# Patient Record
Sex: FEMALE | Race: WHITE
[De-identification: names, ages, dates, MRNs, and addresses within clinical notes are randomized per-mention and may not be internally consistent; named-entity substitution may affect disease eponyms.]

---

## 2017-04-07 ENCOUNTER — HOSPITAL ENCOUNTER (OUTPATIENT)
Dept: HOSPITAL 83 - CT | Age: 67
Discharge: HOME | End: 2017-04-07
Attending: INTERNAL MEDICINE
Payer: COMMERCIAL

## 2017-04-07 DIAGNOSIS — Z90.49: ICD-10-CM

## 2017-04-07 DIAGNOSIS — R63.0: ICD-10-CM

## 2017-04-07 DIAGNOSIS — Z87.891: ICD-10-CM

## 2017-04-07 DIAGNOSIS — Z90.710: ICD-10-CM

## 2017-04-07 DIAGNOSIS — R93.5: Primary | ICD-10-CM

## 2017-04-07 DIAGNOSIS — K59.00: ICD-10-CM

## 2017-04-07 DIAGNOSIS — M43.26: ICD-10-CM

## 2017-04-26 ENCOUNTER — HOSPITAL ENCOUNTER (OUTPATIENT)
Dept: HOSPITAL 83 - RAD | Age: 67
Discharge: HOME | End: 2017-04-26
Attending: INTERNAL MEDICINE
Payer: COMMERCIAL

## 2017-04-26 DIAGNOSIS — Z78.0: Primary | ICD-10-CM

## 2017-12-14 ENCOUNTER — HOSPITAL ENCOUNTER (EMERGENCY)
Dept: HOSPITAL 83 - ED | Age: 67
Discharge: HOME | End: 2017-12-14
Payer: COMMERCIAL

## 2017-12-14 VITALS — BODY MASS INDEX: 19.56 KG/M2 | WEIGHT: 97 LBS | HEIGHT: 58.98 IN

## 2017-12-14 DIAGNOSIS — F17.200: ICD-10-CM

## 2017-12-14 DIAGNOSIS — N39.0: Primary | ICD-10-CM

## 2017-12-14 DIAGNOSIS — R03.0: ICD-10-CM

## 2017-12-14 DIAGNOSIS — Z88.0: ICD-10-CM

## 2017-12-14 DIAGNOSIS — K59.00: ICD-10-CM

## 2017-12-14 LAB
ALBUMIN SERPL-MCNC: 4.1 GM/DL (ref 3.1–4.5)
ALP SERPL-CCNC: 112 U/L (ref 45–117)
ALT SERPL W P-5'-P-CCNC: 27 U/L (ref 12–78)
APPEARANCE UR: CLEAR
AST SERPL-CCNC: 25 IU/L (ref 3–35)
BACTERIA #/AREA URNS HPF: (no result) /[HPF]
BASOPHILS # BLD AUTO: 0.1 10*3/UL (ref 0–0.1)
BASOPHILS NFR BLD AUTO: 0.8 % (ref 0–1)
BILIRUB UR QL STRIP: NEGATIVE
BUN SERPL-MCNC: 20 MG/DL (ref 7–24)
CHLORIDE SERPL-SCNC: 103 MMOL/L (ref 98–107)
COLOR UR: YELLOW
CREAT SERPL-MCNC: 0.82 MG/DL (ref 0.55–1.02)
EOSINOPHIL # BLD AUTO: 0.1 10*3/UL (ref 0–0.4)
EOSINOPHIL # BLD AUTO: 1.1 % (ref 1–4)
EPI CELLS #/AREA URNS HPF: (no result) /[HPF]
ERYTHROCYTE [DISTWIDTH] IN BLOOD BY AUTOMATED COUNT: 13.2 % (ref 0–14.5)
GLUCOSE UR QL: NEGATIVE
HCT VFR BLD AUTO: 45.2 % (ref 37–47)
HGB BLD-MCNC: 15.3 G/DL (ref 12–16)
HGB UR QL STRIP: NEGATIVE
KETONES UR QL STRIP: NEGATIVE
LEUKOCYTE ESTERASE UR QL STRIP: (no result)
LIPASE SERPL-CCNC: 111 U/L (ref 73–393)
LYMPHOCYTES # BLD AUTO: 3.2 10*3/UL (ref 1.3–4.4)
LYMPHOCYTES NFR BLD AUTO: 23.7 % (ref 27–41)
MCH RBC QN AUTO: 31.8 PG (ref 27–31)
MCHC RBC AUTO-ENTMCNC: 33.8 G/DL (ref 33–37)
MCV RBC AUTO: 94 FL (ref 81–99)
MONOCYTES # BLD AUTO: 0.7 10*3/UL (ref 0.1–1)
MONOCYTES NFR BLD MANUAL: 5.5 % (ref 3–9)
NEUT #: 9.1 10*3/UL (ref 2.3–7.9)
NEUT %: 68.4 % (ref 47–73)
NITRITE UR QL STRIP: NEGATIVE
NRBC BLD QL AUTO: 0 % (ref 0–0)
PH UR STRIP: 5 [PH] (ref 5–9)
PLATELET # BLD AUTO: 373 10*3/UL (ref 130–400)
PMV BLD AUTO: 11.4 FL (ref 9.6–12.3)
POTASSIUM SERPL-SCNC: 3.9 MMOL/L (ref 3.5–5.1)
PROT SERPL-MCNC: 8.2 GM/DL (ref 6.4–8.2)
RBC # BLD AUTO: 4.81 10*6/UL (ref 4.1–5.1)
RBC #/AREA URNS HPF: (no result) RBC/HPF (ref 0–2)
SODIUM SERPL-SCNC: 139 MMOL/L (ref 136–145)
SP GR UR: <= 1.005 (ref 1–1.03)
UROBILINOGEN UR STRIP-MCNC: 0.2 E.U./DL (ref 0.2–1)
WBC NRBC COR # BLD AUTO: 13.3 10*3/UL (ref 4.8–10.8)

## 2017-12-27 ENCOUNTER — HOSPITAL ENCOUNTER (EMERGENCY)
Dept: HOSPITAL 83 - ED | Age: 67
Discharge: HOME | End: 2017-12-27
Payer: COMMERCIAL

## 2017-12-27 VITALS — WEIGHT: 94 LBS | HEIGHT: 58.98 IN | BODY MASS INDEX: 18.95 KG/M2

## 2017-12-27 DIAGNOSIS — F17.200: ICD-10-CM

## 2017-12-27 DIAGNOSIS — Z88.0: ICD-10-CM

## 2017-12-27 DIAGNOSIS — Z86.73: ICD-10-CM

## 2017-12-27 DIAGNOSIS — Z79.82: ICD-10-CM

## 2017-12-27 DIAGNOSIS — F32.9: Primary | ICD-10-CM

## 2017-12-27 DIAGNOSIS — Z79.899: ICD-10-CM

## 2018-04-04 ENCOUNTER — HOSPITAL ENCOUNTER (EMERGENCY)
Dept: HOSPITAL 83 - ED | Age: 68
Discharge: HOME | End: 2018-04-04
Payer: COMMERCIAL

## 2018-04-04 VITALS — BODY MASS INDEX: 25.76 KG/M2 | WEIGHT: 140 LBS | HEIGHT: 61.97 IN

## 2018-04-04 DIAGNOSIS — Z98.890: ICD-10-CM

## 2018-04-04 DIAGNOSIS — Z87.891: ICD-10-CM

## 2018-04-04 DIAGNOSIS — Z86.73: ICD-10-CM

## 2018-04-04 DIAGNOSIS — R25.2: Primary | ICD-10-CM

## 2018-04-04 DIAGNOSIS — Z79.82: ICD-10-CM

## 2018-04-04 DIAGNOSIS — Z88.0: ICD-10-CM

## 2018-04-04 DIAGNOSIS — Z79.899: ICD-10-CM

## 2018-04-04 DIAGNOSIS — Z90.710: ICD-10-CM

## 2018-04-04 LAB
BASOPHILS # BLD AUTO: 0.1 10*3/UL (ref 0–0.1)
BASOPHILS NFR BLD AUTO: 0.6 % (ref 0–1)
BUN SERPL-MCNC: 15 MG/DL (ref 7–24)
CHLORIDE SERPL-SCNC: 109 MMOL/L (ref 98–107)
CREAT SERPL-MCNC: 0.66 MG/DL (ref 0.55–1.02)
EOSINOPHIL # BLD AUTO: 0.3 10*3/UL (ref 0–0.4)
EOSINOPHIL # BLD AUTO: 2.8 % (ref 1–4)
ERYTHROCYTE [DISTWIDTH] IN BLOOD BY AUTOMATED COUNT: 13.3 % (ref 0–14.5)
HCT VFR BLD AUTO: 41.8 % (ref 37–47)
HGB BLD-MCNC: 13.8 G/DL (ref 12–16)
LYMPHOCYTES # BLD AUTO: 3.2 10*3/UL (ref 1.3–4.4)
LYMPHOCYTES NFR BLD AUTO: 29.9 % (ref 27–41)
MCH RBC QN AUTO: 31.7 PG (ref 27–31)
MCHC RBC AUTO-ENTMCNC: 33 G/DL (ref 33–37)
MCV RBC AUTO: 95.9 FL (ref 81–99)
MONOCYTES # BLD AUTO: 0.8 10*3/UL (ref 0.1–1)
MONOCYTES NFR BLD MANUAL: 7.1 % (ref 3–9)
NEUT #: 6.3 10*3/UL (ref 2.3–7.9)
NEUT %: 59.1 % (ref 47–73)
NRBC BLD QL AUTO: 0 % (ref 0–0)
PLATELET # BLD AUTO: 315 10*3/UL (ref 130–400)
PMV BLD AUTO: 10.8 FL (ref 9.6–12.3)
POTASSIUM SERPL-SCNC: 4.1 MMOL/L (ref 3.5–5.1)
RBC # BLD AUTO: 4.36 10*6/UL (ref 4.1–5.1)
SODIUM SERPL-SCNC: 143 MMOL/L (ref 136–145)
TROPONIN I SERPL-MCNC: < 0.015 NG/ML (ref ?–0.04)
WBC NRBC COR # BLD AUTO: 10.6 10*3/UL (ref 4.8–10.8)

## 2018-06-14 ENCOUNTER — HOSPITAL ENCOUNTER (OUTPATIENT)
Dept: HOSPITAL 83 - CT | Age: 68
Discharge: HOME | End: 2018-06-14
Attending: INTERNAL MEDICINE
Payer: COMMERCIAL

## 2018-06-14 DIAGNOSIS — I70.201: Primary | ICD-10-CM

## 2018-07-18 ENCOUNTER — HOSPITAL ENCOUNTER (OUTPATIENT)
Dept: HOSPITAL 83 - RAD | Age: 68
Discharge: HOME | End: 2018-07-18
Attending: INTERNAL MEDICINE
Payer: COMMERCIAL

## 2018-07-18 DIAGNOSIS — R07.9: Primary | ICD-10-CM

## 2019-01-04 ENCOUNTER — HOSPITAL ENCOUNTER (OUTPATIENT)
Dept: HOSPITAL 83 - US | Age: 69
Discharge: HOME | End: 2019-01-04
Attending: SURGERY
Payer: COMMERCIAL

## 2019-01-04 DIAGNOSIS — Z95.820: ICD-10-CM

## 2019-01-04 DIAGNOSIS — I73.89: Primary | ICD-10-CM

## 2019-01-31 ENCOUNTER — HOSPITAL ENCOUNTER (OUTPATIENT)
Dept: HOSPITAL 83 - CT | Age: 69
Discharge: HOME | End: 2019-01-31
Attending: INTERNAL MEDICINE
Payer: COMMERCIAL

## 2019-01-31 DIAGNOSIS — M51.36: Primary | ICD-10-CM

## 2019-01-31 DIAGNOSIS — M47.896: ICD-10-CM

## 2019-04-30 ENCOUNTER — HOSPITAL ENCOUNTER (OUTPATIENT)
Dept: HOSPITAL 83 - RAD | Age: 69
Discharge: HOME | End: 2019-04-30
Attending: INTERNAL MEDICINE
Payer: COMMERCIAL

## 2019-04-30 DIAGNOSIS — N95.9: ICD-10-CM

## 2019-04-30 DIAGNOSIS — Z90.710: ICD-10-CM

## 2019-04-30 DIAGNOSIS — Z13.820: Primary | ICD-10-CM

## 2019-06-17 ENCOUNTER — HOSPITAL ENCOUNTER (OUTPATIENT)
Dept: HOSPITAL 83 - INJECTION | Age: 69
Discharge: HOME | End: 2019-06-17
Attending: INTERNAL MEDICINE
Payer: COMMERCIAL

## 2019-06-17 VITALS — DIASTOLIC BLOOD PRESSURE: 68 MMHG

## 2019-06-17 DIAGNOSIS — J43.9: ICD-10-CM

## 2019-06-17 DIAGNOSIS — Z86.73: ICD-10-CM

## 2019-06-17 DIAGNOSIS — E78.00: ICD-10-CM

## 2019-06-17 DIAGNOSIS — M81.0: Primary | ICD-10-CM

## 2019-06-17 DIAGNOSIS — F17.200: ICD-10-CM

## 2019-07-06 ENCOUNTER — HOSPITAL ENCOUNTER (EMERGENCY)
Dept: HOSPITAL 83 - ED | Age: 69
Discharge: HOME | End: 2019-07-06
Payer: COMMERCIAL

## 2019-07-06 VITALS — BODY MASS INDEX: 21.57 KG/M2 | HEIGHT: 58.98 IN | WEIGHT: 107 LBS

## 2019-07-06 DIAGNOSIS — H57.89: ICD-10-CM

## 2019-07-06 DIAGNOSIS — Z79.899: ICD-10-CM

## 2019-07-06 DIAGNOSIS — F17.200: ICD-10-CM

## 2019-07-06 DIAGNOSIS — Z88.0: ICD-10-CM

## 2019-07-06 DIAGNOSIS — J44.1: ICD-10-CM

## 2019-07-06 DIAGNOSIS — Z79.82: ICD-10-CM

## 2019-07-06 DIAGNOSIS — J32.9: Primary | ICD-10-CM

## 2019-07-11 ENCOUNTER — HOSPITAL ENCOUNTER (INPATIENT)
Dept: HOSPITAL 83 - ED | Age: 69
LOS: 6 days | Discharge: HOME | DRG: 193 | End: 2019-07-17
Attending: INTERNAL MEDICINE | Admitting: INTERNAL MEDICINE
Payer: COMMERCIAL

## 2019-07-11 VITALS — DIASTOLIC BLOOD PRESSURE: 60 MMHG

## 2019-07-11 VITALS — BODY MASS INDEX: 21.46 KG/M2 | HEIGHT: 60 IN | WEIGHT: 109.31 LBS

## 2019-07-11 VITALS — DIASTOLIC BLOOD PRESSURE: 68 MMHG | SYSTOLIC BLOOD PRESSURE: 144 MMHG

## 2019-07-11 VITALS — DIASTOLIC BLOOD PRESSURE: 76 MMHG

## 2019-07-11 VITALS — DIASTOLIC BLOOD PRESSURE: 72 MMHG

## 2019-07-11 VITALS — DIASTOLIC BLOOD PRESSURE: 90 MMHG

## 2019-07-11 VITALS — DIASTOLIC BLOOD PRESSURE: 66 MMHG | SYSTOLIC BLOOD PRESSURE: 126 MMHG

## 2019-07-11 VITALS — DIASTOLIC BLOOD PRESSURE: 76 MMHG | SYSTOLIC BLOOD PRESSURE: 156 MMHG

## 2019-07-11 DIAGNOSIS — Z88.0: ICD-10-CM

## 2019-07-11 DIAGNOSIS — Z83.3: ICD-10-CM

## 2019-07-11 DIAGNOSIS — E87.2: ICD-10-CM

## 2019-07-11 DIAGNOSIS — Z79.899: ICD-10-CM

## 2019-07-11 DIAGNOSIS — Z98.891: ICD-10-CM

## 2019-07-11 DIAGNOSIS — E89.0: ICD-10-CM

## 2019-07-11 DIAGNOSIS — Z95.820: ICD-10-CM

## 2019-07-11 DIAGNOSIS — J18.1: Primary | ICD-10-CM

## 2019-07-11 DIAGNOSIS — Z82.49: ICD-10-CM

## 2019-07-11 DIAGNOSIS — I73.9: ICD-10-CM

## 2019-07-11 DIAGNOSIS — E78.00: ICD-10-CM

## 2019-07-11 DIAGNOSIS — J96.01: ICD-10-CM

## 2019-07-11 DIAGNOSIS — Z86.73: ICD-10-CM

## 2019-07-11 DIAGNOSIS — M19.90: ICD-10-CM

## 2019-07-11 DIAGNOSIS — J44.1: ICD-10-CM

## 2019-07-11 DIAGNOSIS — Z90.49: ICD-10-CM

## 2019-07-11 DIAGNOSIS — Z90.710: ICD-10-CM

## 2019-07-11 DIAGNOSIS — J44.0: ICD-10-CM

## 2019-07-11 DIAGNOSIS — Z80.8: ICD-10-CM

## 2019-07-11 DIAGNOSIS — M06.9: ICD-10-CM

## 2019-07-11 DIAGNOSIS — G89.29: ICD-10-CM

## 2019-07-11 DIAGNOSIS — Z79.82: ICD-10-CM

## 2019-07-11 DIAGNOSIS — M54.5: ICD-10-CM

## 2019-07-11 DIAGNOSIS — E78.2: ICD-10-CM

## 2019-07-11 DIAGNOSIS — F17.210: ICD-10-CM

## 2019-07-11 LAB
ALBUMIN SERPL-MCNC: 3.8 GM/DL (ref 3.1–4.5)
ALP SERPL-CCNC: 112 U/L (ref 45–117)
ALT SERPL W P-5'-P-CCNC: 20 U/L (ref 12–78)
APTT PPP: 23.7 SECONDS (ref 20–32.1)
AST SERPL-CCNC: 18 IU/L (ref 3–35)
BUN SERPL-MCNC: 15 MG/DL (ref 7–24)
CHLORIDE SERPL-SCNC: 107 MMOL/L (ref 98–107)
CREAT SERPL-MCNC: 0.78 MG/DL (ref 0.55–1.02)
ERYTHROCYTE [DISTWIDTH] IN BLOOD BY AUTOMATED COUNT: 14.6 % (ref 0–14.5)
HCT VFR BLD AUTO: 38.8 % (ref 37–47)
HGB BLD-MCNC: 12.2 G/DL (ref 12–16)
INR BLD: 0.9 (ref 2–3.5)
MCH RBC QN AUTO: 29 PG (ref 27–31)
MCHC RBC AUTO-ENTMCNC: 31.4 G/DL (ref 33–37)
MCV RBC AUTO: 92.2 FL (ref 81–99)
NRBC BLD QL AUTO: 0 10*3/UL (ref 0–0)
PLATELET # BLD AUTO: 563 10*3/UL (ref 130–400)
PLATELET SUFFICIENCY: (no result)
PMV BLD AUTO: 10.4 FL (ref 9.6–12.3)
POTASSIUM SERPL-SCNC: 3.7 MMOL/L (ref 3.5–5.1)
PROT SERPL-MCNC: 7.7 GM/DL (ref 6.4–8.2)
RBC # BLD AUTO: 4.21 10*6/UL (ref 4.1–5.1)
RBC MORPH BLD: NORMAL
SODIUM SERPL-SCNC: 140 MMOL/L (ref 136–145)
TOTAL CELLS COUNTED: 100 #CELLS
TROPONIN I SERPL-MCNC: < 0.015 NG/ML (ref ?–0.04)
WBC NRBC COR # BLD AUTO: 16.8 10*3/UL (ref 4.8–10.8)

## 2019-07-11 NOTE — NUR
PRN Norco given at this time for patient c/o back pain rating a 7/10. Call
light is within reach, will continue to monitor.

## 2019-07-11 NOTE — PR
[place], [place]
 
                                      PROGRESS NOTE
 
        NAME: ERIC SINGH               ACCT #: [id number]  
        UNIT #: [id number]                       ROOM: 516       
        DOCTOR: AMBER NUNN MD               BIRTHDATE: [birth year]
 
 
DOS: 07/17/2019
 
SUBJECTIVE:  The patient is doing well without any complaints this morning,
underwent the bronchoscopy yesterday.
 
OBJECTIVE:
VITAL SIGNS:  Graphic trend shows a pressure 134/66, pulse of 80, respirations
16, temperature 97.9.
LUNGS:  Clear.
HEART:  Regular.
ABDOMEN:  Soft.
EXTREMITIES:  Without any edema.
 
LABORATORY DATA:  Blood culture shows no bacterial growth.  Sputum culture
preliminary moderate, a few gram-negative bacilli, fungal cultures no
abnormality.
 
ASSESSMENT AND PLAN:
1.  Acute exacerbation of chronic obstructive pulmonary disease, improved
bronchospasm, is finally resolved.
2.  Possibility of a lung nodule.  Dr. Srivastava advised PET scan as an outpatient.
3.  Rheumatoid arthritis with also primary osteoarthritis with chronic pain
control.  ESR is normal.  Rest of the rheumatoid panel is not available yet.
4.  Moderate cigarette smoker.  The patient is encouraged to quit smoking.  The
patient declined any medicines for that.
5.  Lactic acidosis of unknown etiology.  The patient did receive fluids for
several days.  We will check the lactic acid before discharge today.
 
 
 
_________________________________
AMBER NUNN MD
 
CM:PNTRANS
 
D: 07/17/19 0655                 
T: 07/17/19 0703
             
                                                            
AMBER NUNN MD               
                                                            
07/17/19
0705
                              
interface

## 2019-07-11 NOTE — NUR
PRN Norco seems effective, patient is sleeping with easy and regular respers
on room air. Call light is within reach.

## 2019-07-11 NOTE — PR
[place], [place]
 
                                      PROGRESS NOTE
 
        NAME: ERIC SINGH               ACCT #: [id number]  
        UNIT #: [id number]                       ROOM: 516       
        DOCTOR: DIANA BOX MD,MOHAN           BIRTHDATE: [birth year]
 
 
DOS: 07/17/2019
 
SUBJECTIVE:  She has been noted marked improvement and reduction in the
respiratory symptoms after bronchoscopy.  Denies symptoms of fever or chills,
coughing or any sputum expectoration or any abdominal pain.
 
OBJECTIVE:
VITAL SIGNS:  Normal temperature, respiratory rate 16, heart rate 100, blood
pressure 130/66 early recorded.  Pulse ox saturation with room air 94%
saturation.
HEENT:  Examination shows head was atraumatic.  Eyes nonicterus.
NECK:  Supple.
CARDIOVASCULAR:  S1, S2 audible.
LUNGS:  The patient was noted clear wheezing bilaterally.  There were no
crackles.
ABDOMEN:  Soft, nontender.
 
LABORATORY DATA:  The culture results of the bronchial washing as normal jeffrey.
 
IMPRESSION:
1. The patient with possibility of small airway disease with a nodular density
in the right lower lobe.
2. Small left upper lung pulmonary nodule as well medially located.
3. Acute exacerbation of chronic obstructive pulmonary disease.
 
PLAN OF MANAGEMENT:  The patient could be discharged home.  The patient's oral
medications as antibiotics and tapering dose of prednisone.  Outpatient followup
for the patient was recommended for the pulmonary nodule.  Other therapy, plan
of management, care plan and treatment.
 
 
 
 
_________________________________
MOHAN ORTIZ MD
 
CM:PNTRANS
 
D: 07/17/19 0959                 
T: 07/17/19 1420
             
                                                            
MOHAN BOX MD           
                                                            
07/24/19
0837
                              
interface

## 2019-07-11 NOTE — PROC NOTE
[place], [place]
 
                                     PROCEDURE NOTE
 
        NAME: ERIC SINGH                ACCT #: [id number]
        UNIT #: [id number]                        ROOM: 516       
        DOCTOR: DIANA BOX MD,MOHAN            BIRTHDATE: [birth year]
 
 
DOS: 07/16/2019
 
BRONCHOSCOPY NOTE
 
PREOPERATIVE DIAGNOSIS:  Nonresolving severe cough with pulmonary nodule.
 
POSTOPERATIVE DIAGNOSIS:  Removal of significant amount of mucus impaction from
major airways bilaterally.
 
FINDINGS:  Tracheobronchitis.
 
PROCEDURE DESCRIPTION:  Informed consent was obtained for the patient.  The
patient was brought to the OR and placed in supine position.  Conscious sedation
was administered by the Anesthesia Department.  After that, the airway
introduced into the mouth.  Bronchoscope was advanced to the airway into
laryngeal area.  Epiglottis and vocal cords seen.  Vocal cord was moving
symmetrically with movements.  Bronchoscope was advanced to the vocal cord and
tracheal lumen.  Tracheal lumen was identified.  Tracheal lumen was noted with
moderate amount of thick mucus, suctioned out to the clemencia level.  Right upper,
right middle, right lower, left upper, lingular lower lobe bronchi noted with
large plugs of mucus, which was removed with the help of normal saline wash
without any difficulty.  Postoperative findings were discussed with the
patient's  and the patient in detail in the recovery room.
 
 
 
_________________________________
MOHAN ORTIZ MD
 
CM:PROCNOTE:PROCEDURE NOTE
 
D: 07/16/19 1246
T: 07/17/19 0043
    
                                                            
MOHAN BOX MD

## 2019-07-11 NOTE — EKG
[place], [place]
 
                               ELECTROCARDIOGRAM REPORT
 
        NAME: ERIC SINGH                ACCT #: [id number]  
        UNIT #: [id number]                        ROOM: 516       
        DOCTOR: MEIR DRAFT REPORT          BIRTHDATE: [birth year]
 
 
 

 
 
                           [place]
                                       
Test Date:    2019-07-15               Test Time:    10:40:45
Pat Name:     ERIC SINGH          Department:   
Patient ID:   [id number]             Room:         51 1
Gender:       F                        Technician:   
:          1950               Requested By: MOHAN BOX
Order Number: [id number]      Reading MD:   Mohan Srivastava MD
                                 Measurements
Intervals                              Axis          
Rate:         97                       P:            77
NV:           122                      QRS:          70
QRSD:         71                       T:            65
QT:           339                                    
QTc:          431                                    
                           Interpretive Statements
Sinus rhythm
Right atrial enlargement
Compared to ECG 2019 17:30:52
No significant changes
 
Electronically Signed On 7- 10:52:02 PDT by Mohan Srivastava MD
 
CM:EKGRPT:ELECTROCARDIOGRAM REPORT
 
D: 07/15/19 1040
T: 07/15/19 1052
    
MOHAN WORLEY DRAFT REPORT         
MOHAN BOX MD

## 2019-07-11 NOTE — PR
[place], [place]
 
                                      PROGRESS NOTE
 
        NAME: ERIC SINGH               ACCT #: [id number]  
        UNIT #: [id number]                       ROOM: 516       
        DOCTOR: AMBER NUNN MD               BIRTHDATE: [birth year]
 
 
DOS: 07/15/2019
 
SUBJECTIVE:  The patient is about the same, does not have any new complaints,
awaiting a bronchoscopy this morning.
 
OBJECTIVE:
VITAL SIGNS:  Blood pressure is 137/79, pulse of 90, respirations 18,
temperature 98.1.
LUNGS:  Diminished breath sounds, scattered wheezes and rhonchi.
HEART:  Regular.
ABDOMEN:  Obese, soft, nontender.
EXTREMITIES:  Without any edema.
 
ASSESSMENT AND PLAN:
1.  Acute exacerbation of chronic obstructive pulmonary disease, improving
slowly.
2.  Right lower lobe pneumonia versus pulmonary nodule, awaiting a bronchoscopy
today.
3.  Rheumatoid arthritis with chronic pain.  ESR is normal.
4.  Moderate cigarette smoker.  The patient is encouraged to quit smoking.  She
has not had any cigarettes since admission.
5.  Chronic pain from primary osteoarthritis along with rheumatoid arthritis. 
Continue pain medications.
6.  Hypoxic respiratory failure, which has resolved.  Await for bronchoscopy
results to decide on discharge planning.
 
 
 
_________________________________
AMBER NUNN MD
 
CM:PNTRANS
 
D: 07/16/19 0808                 
T: 07/16/19 2230
             
                                                            
AMBER NUNN MD               
                                                            
07/16/19
2231
                              
interface

## 2019-07-11 NOTE — NUR
Patient is resting in bed with easy and regular respers on room air.
Assessment is complete with no c/o or s/s of distress noted at this time. Bed
is low, locked, and call light is within reach. Will continue to monitor, see
shift assessment.

## 2019-07-11 NOTE — EKG
[place], [place]
 
                               ELECTROCARDIOGRAM REPORT
 
        NAME: ERIC SINGH                ACCT #: [id number]  
        UNIT #: [id number]                        ROOM: 516       
        DOCTOR: MEIR DRAFT REPORT          BIRTHDATE: [birth year]
 
 
 

 
 
                           [facility]
                                       
Test Date:    2019               Test Time:    14:35:46
Pat Name:     ERIC SINGH          Department:   
Patient ID:   [id number]             Room:         516
Gender:       F                        Technician:   Jil Connelly
:          1950               Requested By: BRYAN VERDE
Order Number: [id number]      Reading MD:   Fely Paz MD
                                 Measurements
Intervals                              Axis          
Rate:         84                       P:            75
IL:           130                      QRS:          51
QRSD:         72                       T:            40
QT:           386                                    
QTc:          457                                    
                           Interpretive Statements
Sinus rhythm
Right atrial enlargement
Compared to ECG 2018 20:57:04
No significant changes
 
Electronically Signed On 2019 5:00:11 PDT by Fely Paz MD
 
CM:EKGRPT:ELECTROCARDIOGRAM REPORT
 
D: 19 1435
T: 19 0500
    
BRYAN WORLEY DRAFT REPORT         
BRYAN VERDE M.D.

## 2019-07-11 NOTE — PR
[place], [place]
 
                                      PROGRESS NOTE
 
        NAME: ERIC SINGH               ACCT #: [id number]  
        UNIT #: [id number]                       ROOM: 516       
        DOCTOR: CARLA PUENTE MD            BIRTHDATE: [birth year]
 
 
DOS: 07/14/2019
 
OBJECTIVE:
GENERAL APPEARANCE:  The patient is alert and oriented x 3, in no visible
distress.
VITAL SIGNS:  Blood pressure 132/76, heart rate 96 beats per minute, breathing
18 times a minute, temperature 98.3 degrees Fahrenheit.
HEENT AND NECK:  Exam within normal limits.
CARDIOVASCULAR SYSTEM:  Heart rate is regular in rate and rhythm.  S1 and S2
normally audible.
LUNGS:  Clear to auscultation.
ABDOMEN:  Soft, nontender.  No obvious organomegaly.  Bowel sounds are present.
EXTREMITIES:  Without significant cyanosis or edema.
 
IMPRESSION:
1.  The patient with acute exacerbation of chronic obstructive pulmonary
disease, continues to improve with treatment.  The patient on IV corticosteroids
and antibiotic, bronchodilators.  CT scan of the chest shows infiltrate versus
pulmonary nodule, which is a new finding since last CAT scan performed in 2014
in the right lower lung.
2.  Right lower lung infiltrate.  Dr. Srivastava been consulted to evaluate.
3.  Arterial Dopplers of the lower extremities, status post stenting, is normal.
4.  Nicotine smoke dependence.  The patient is being encouraged to stop.
5.  Hypothyroidism, treated with levothyroxine.
6.  Mixed hyperlipidemia, treated with simvastatin.
 
 
 
_________________________________
CARLA PUENTE MD
 
CM:PNTRANS
 
D: 07/14/19 1518                 
T: 07/15/19 0045
             
                                                            
CARLA PUENTE MD            
                                                            
07/15/19
0043
                              
interface

## 2019-07-11 NOTE — NUR
CCA 68, admitted to [facility], under the
services of AMBER Lujan MD with a diagnosis of SEVERE SEPSIS, PNEUMONIA.
Chief complaint is SOB.
Patient arrived via  from ER.
Monitor applied. Initial assessment completed.
Vital signs taken and recorded.
AMBER LUJAN MD notified of admission to the unit.
Orders received.
See assessment for past medical history, medications
and allergies.
Patient and/or family oriented to unit. [facility] 516-1
visitation policy reviewed.
Clothing/patient valuable form completed.
 
JAZMÍN COTTER.

## 2019-07-11 NOTE — WRIGHTHP
[place], [place]
 
                               PATIENT HISTORY AND PHYSICAL EXAM
 
        [facility]: ERIC SINGH                [id number]  
        [facility] #: [id number]                        ROOM: 516       
        DOCTOR: AMBER NUNN MD                BIRTHDATE: [birth year]
 
 
DOS: 07/11/2019
 
HISTORY OF PRESENT ILLNESS:  This patient is a 68-year-old.  The patient was
seen in the office with complaints of increasing shortness of breath.  There
were no direct beds of admission, so the patient was sent out to the Emergency
Room.  She was reevaluated again in the ER, this is the second visit in the last
3-4 days.  This time she was diagnosed with pneumonia and was admitted.  In the
office, she was hypoxic with a saturation in the low 90s on room air.  She
denies having any chest pains, palpitations.  The cough is productive of scant
amount of sputum.  Denies having any nausea, any emesis, chest pains or
palpitations.
 
PAST MEDICAL HISTORY:  Significant for:
1.  Hospital admission last year with complaints of chest pain with a negative
stress test.
2.  Moderate nicotine abuse.
3.  Hypothyroidism.
4.  Mixed hyperlipidemia.
 
MEDICATIONS:  She is on are Plavix 75 daily, aspirin 81 daily, Norco 5 q.4,
levothyroxine 100 mcg, simvastatin 40 daily.
 
SOCIAL HISTORY:  Smoker of about 1 pack of cigarettes a day.  Denies using any
alcohol.  She lives at home alone.
 
PHYSICAL EXAMINATION:
GENERAL:  She is awake and alert and oriented.  She was pretty short of breath
when I initially saw in the office with mild-to-moderate respiratory distress,
room air saturation of 90.
VITAL SIGNS:  Pulse 70, respirations 20, temperature 97.9, blood pressure of
134/75.
LUNGS:  Diminished breath sounds, scattered wheezes and rhonchi.
HEART:  Regular.
ABDOMEN:  Obese, soft, nontender.
EXTREMITIES:  Without any edema.
 
ASSESSMENT AND PLAN:
1.  Acute exacerbation of chronic obstructive pulmonary disease.  The patient is
placed on IV steroids and antibiotics.
2.  Moist sounding cough with sepsis pattern, will do blood cultures, sputum
cultures.  Mucinex has been added.  IV antibiotics have been added.  A CT of the
chest will be done to rule out extent of pneumonia.
3.  Moderate cigarette smoker.  The patient does not want to have any patch.
4.  Severe peripheral vascular disease, status post stenting.  She was supposed
to undergo a peripheral arterial Doppler and Radiology today will try to get it
done since she is inpatient.
 
 
 
 
                              [place], [place]
 
                               PATIENT HISTORY AND PHYSICAL EXAM
 
        NAME: ERIC SINGH                ACCT #: [id number]  
        UNIT #: [id number]                        ROOM: [facility]       
        DOCTOR: AMBER NUNN MD                BIRTHDATE: [birth year]
 
 
_________________________________
AMBER NUNN MD
 
CM:HISPHYS:PATIENT HISTORY AND PHYSICAL EXAMINATION
 
D: 07/12/19 0741
T: 07/12/19 0751
    
                                                            
AMBER NUNN MD               
                                                            
07/12/19
0750
                              
interface

## 2019-07-11 NOTE — PR
[place], [place]
 
                                      PROGRESS NOTE
 
        NAME: ERIC SINGH               ACCT #: [id number]  
        UNIT #: [id number]                       ROOM: 516       
        DOCTOR: AMBER NUNN MD               BIRTHDATE: [birth year]
 
 
DOS: 
 
SUBJECTIVE:  The patient complains of a lot of diffuse aches and pains.
 
OBJECTIVE:
VITAL SIGNS:  Graphic trend shows a pressure of 136/62, pulse of 70,
respirations 16, temperature 98.
LUNGS:  Diminished breath sounds.  Fairly clear this morning.
HEART:  Regular.
ABDOMEN:  Soft.
EXTREMITIES:  Without any edema.
 
ASSESSMENT AND PLAN:
1.  Right lower lobe pneumonia with no nodular opacities suggestive of
atelectasis versus pulmonary nodule.  Awaiting Dr. Srivastava's input.  The patient
may benefit from a bronchoscopy.  Clinically, the patient is improving as far as
the pneumonia is concerned.
2.  Acute exacerbation of chronic obstructive pulmonary disease, stable.  We
will taper the steroids down.
3.  Rheumatoid arthritis with chronic pain, already on multiple pain
medications.  We will do rheumatoid panel and see whether she will qualify for
low dose methotrexate.
 
 
 
_________________________________
AMBER NUNN MD
 
CM:PNTRANS
 
D: 07/15/19 0705                 
T: 07/15/19 0713
             
                                                            
AMBER NUNN MD               
                                                            
07/16/19
0432
                              
interface

## 2019-07-11 NOTE — EKG
[place], [place]
 
                               ELECTROCARDIOGRAM REPORT
 
        NAME: ERCI SINGH                ACCT #: [id number]  
        UNIT #: [id number]                        ROOM: 516       
        DOCTOR: MEIR DRAFT REPORT          BIRTHDATE: [birth year]
 
 
 

 
 
                           [facility]
                                       
Test Date:    2019               Test Time:    12:04:06
Pat Name:     ERIC SINGH          Department:   
Patient ID:   [id number]             Room:         516
Gender:       F                        Technician:   
:          1950               Requested By: BRYAN VERDE
Order Number: [id number]      Reading MD:   Fely Paz MD
                                 Measurements
Intervals                              Axis          
Rate:         92                       P:            76
NH:           119                      QRS:          61
QRSD:         76                       T:            51
QT:           375                                    
QTc:          464                                    
                           Interpretive Statements
Sinus arrhythmia
Borderline short NH interval
Compared to ECG 2018 20:57:04
Sinus rhythm no longer present
Atrial abnormality no longer present
 
Electronically Signed On 2019 4:17:38 PDT by Fely Paz MD
 
CM:EKGRPT:ELECTROCARDIOGRAM REPORT
 
D: 19 1204
T: 19 0417
    
BRYAN WORLEY DRAFT REPORT         
BRYAN VERDE M.D.

## 2019-07-11 NOTE — EKG
[place], [place]
 
                               ELECTROCARDIOGRAM REPORT
 
        NAME: ERIC SINGH                ACCT #: [id number]  
        UNIT #: [id number]                        ROOM: 516       
        DOCTOR: MEIR DRAFT REPORT          BIRTHDATE: [birth year]
 
 
 

 
 
                           [facility]
                                       
Test Date:    2019               Test Time:    17:30:52
Pat Name:     ERIC SINGH          Department:   
Patient ID:   [id number]             Room:         516
Gender:       F                        Technician:   Jil Connelly
:          1950               Requested By: BRYAN VERDE
Order Number: [id number]      Reading MD:   Fely Paz MD
                                 Measurements
Intervals                              Axis          
Rate:         85                       P:            83
IL:           129                      QRS:          73
QRSD:         79                       T:            69
QT:           383                                    
QTc:          456                                    
                           Interpretive Statements
Sinus rhythm
Right atrial enlargement
Compared to ECG 2018 20:57:04
No significant changes
 
Electronically Signed On 2019 5:02:35 PDT by Fely Paz MD
 
CM:EKGRPT:ELECTROCARDIOGRAM REPORT
 
D: 19 1730
T: 19 0502
    
BRYAN WORLEY DRAFT REPORT         
BRYAN VERDE M.D.

## 2019-07-11 NOTE — CON
[place], [place]
 
                                 REPORT OF CONSULTATION
 
        NAME: ERIC SINGH                 [facility]: [id number]  
        UNIT #: [id number]                         ROOM: 516       
        DOCTOR: MOHAN REINOSO MD             BIRTHDATE: [birth year]
 
 
DOS: 07/15/2019
 
PULMONARY CONSULTATION, EVALUATION, AND MANAGEMENT
 
REASON FOR CONSULTATION:  To assess the patient's symptoms of shortness of
breath and cough.
 
HISTORY OF PRESENT ILLNESS:  A 68-year-old white female who has been admitted to
the hospital since 2019.  The patient presented to the hospital Emergency
Room as she has been started experiencing increased symptoms of chest congestion
and tightness in the chest.  She was also reporting symptoms of progressive
cough, which remained nonproductive.  She has been coughing excessively, unable
to expectorate sputum.  Complaining of soreness in the upper portion of abdomen
because of excessive nonproductive cough.  The patient denies symptoms of
hemoptysis.  Shortness of breath and wheezing was also reported.  The patient
has been treated with intravenous steroids and other medical management for
acute exacerbation of COPD.  CT scan of the chest was completed that has
reported evidence of pulmonary nodule.
 
REVIEW OF SYSTEMS:
CONSTITUTIONAL:  Fatigue and tiredness noted without any symptoms of fever or
chills.
EYES:  Denies any burning, redness, or tenderness.
EARS, NOSE, THROAT SYMPTOMS:  Denies sore throat, hoarseness, otalgia, postnasal
drainage or epistaxis.
CARDIOVASCULAR:  Denies anginal pain, edema, or pain of the lower extremities.
GASTROINTESTINAL SYMPTOMS:  Denies dysphagia, nausea, vomiting, diarrhea,
abdominal pain, hematemesis, or melena.  The abdominal pain reported in the
anterior abdominal wall is related to the cough and muscle contraction.
GENITOURINARY SYMPTOMS:  Denies dysuria, suprapubic pain, or hematuria.
MUSCULOSKELETAL:  No acute joint pain, redness, or tenderness.
SKIN:  No lesions or rashes reported.
 
Remaining systems were reviewed.  They were noted all negative.
 
PAST MEDICAL HISTORY:  Known reported:
1.  COPD.
2.  Chronic nicotine dependence.
3.  Hypothyroidism.
4.  Hypercholesterolemia and hyperlipidemia.
5.  Anginal pain.
6.  History of transient ischemic attack.
7.  Intervertebral disk disease.
 
PAST SURGICAL HISTORY:  Noted:
1.  .
2.  Hysterectomy.
3.  Cholecystectomy.
4.  Intervertebral disk removed of the lumbar and cervical spine.
5.  Bilateral carpal tunnel release.
 
                              [place], [place]
 
                                 REPORT OF CONSULTATION
 
        NAME: ERIC SINGH                 ACCT #: [id number]  
        UNIT #: [id number]                         ROOM: 516       
        DOCTOR: MOHAN REINOSO MD             BIRTHDATE: [birth year]
 
 
 
SOCIAL HISTORY:  The patient is , has 2 children, lives at home. 
Tobacco use noted since teens, a pack of cigarettes per day.  Active use noted
since hospitalization.
 
FAMILY HISTORY:  Reported for diabetes, hypertension and cancer.
 
HOME MEDICATIONS:  Medications which were listed in the home used as aspirin,
Plavix.  Recent prescription oral doxycycline, Norco, levothyroxine, tapering
prednisone and simvastatin.
 
DRUG ALLERGIES:  NOTED AS ALLERGY TO PENICILLINS.
 
CURRENT MEDICATIONS:  Administered Solu-Medrol 30 mg q. 8 hours, Plavix,
aspirin, levothyroxine, simvastatin, Mucinex 600 mg p.o. b.i.d., DuoNeb q. 4
hours and other meds.
 
PHYSICAL EXAMINATION:
GENERAL:  This is a 68-year-old female who is currently noted to be awake and
alert sitting on the bed this morning of assessment.  The weight of 109 pounds,
BMI 21.3.
VITAL SIGNS:  Normal temperature since hospitalization, respiratory rate
recorded as 16-20, heart rate of 102-91, blood pressure ____.  Pulse oxygen
saturation on rest and room air is 94% saturation recorded.
HEENT:  Examination shows head was atraumatic.  Eyes nonicterus.
NECK:  Supple.
CARDIOVASCULAR:  S1, S2 is audible.  No added sounds.
LUNGS:  Noted diffuse reduction in breath sounds with expiratory wheezing noted,
moderate to severe.
ABDOMEN:  Soft, nontender.  Bowel sounds present.
EXTREMITIES:  Without acute edema.
MUSCULOSKELETAL:  Without acute deformities.
CENTRAL NERVOUS SYSTEM:  Cranial nerves 2-12 intact.
 
LABORATORY DATA:  Reviewed PT/PTT on 2019 normal on admission noted as
normal CMP.  CBC of 2019, WBC count 16.4, hemoglobin and hematocrit
normal, platelet count normal.  Lactic acid 2.1, then variably elevated.  Blood
culture from 2019 shows no bacterial growth.  ESR was 27.  Chest x-ray
that was done one-view reviewed the finding of chronic obstructive pulmonary
disease, hyperinflation without any acute pulmonary infiltration.  CT scan chest
that was completed on 2019 was reported by the radiologist report as 1.4
cm x 0.7 cm.  Patchy airspace density in the medial basal subsegment of the
right lower lobe.  A small nodule, 4.5 mm noted in the left upper lobe medially.
 It was reported as previous size increased from 4.6 mm to 5.8 mm of maximum
dimension.  The CT scan comparison was made from 2019 was compared to the
CT scan of 2014.  Changes of centrilobular emphysema was also noted.
 
IMPRESSION:
1.  Small acute localize infiltration pneumonia was noted in the right lower
lobe is most likely; however, the 2 nodules cannot be completely excluded.
 
                              [place], [place]
 
                                 REPORT OF CONSULTATION
 
        NAME: ERIC SINGH                 ACCT #: [id number]  
        UNIT #: [id number]                         ROOM: 51       
        DOCTOR: MOHAN REINOSO MD             BIRTHDATE: [birth year]
 
 
2.  Small nodule noted in the left upper lobe as well as nodular density in the
right lower lobe.  The right lower lobe could be considered localized
atelectasis and infection.
3.  Ongoing acute exacerbation of chronic obstructive pulmonary disease that has
not been resolving with current medical management.
4.  Chronic nicotine dependence.
 
PLAN OF MANAGEMENT:  Continuation of the bronchodilators, oxygen supplementation
and increase the dose of corticosteroids to 40 mg every 8 hours.  Bronchoscopy
will be done for assessment of the mucus impactions as the patient has not been
showing much improvement in symptoms and continued to remain with ongoing
symptoms of cough, which has not been resolving the musculoskeletal symptoms
because of excessive contraction of the muscles with ineffective sputum
expectoration with cough.  The patient will be continued with continuation of
the Mucinex.  Continue bronchodilators.  Other therapy, plan of management and
additional treatment changes will be recommended based on progression of
illness.  Risk and the benefits of the bronchoscopy were done and agreed.  The
procedure will be done tomorrow morning.
 
 
 
_________________________________
MOHAN ORTIZ MD
 
CM:CONSTR:REPORT OF CONSULTATION
 
D: 07/15/19 1223   T: 07/15/19                           
07/15/19     1537                                          interface

## 2019-07-11 NOTE — PR
[place], [place]
 
                                      PROGRESS NOTE
 
        NAME: ERIC SINGH               ACCT #: [id number]  
        UNIT #: [id number]                       ROOM: 516       
        DOCTOR: CARLA PUENTE MD            BIRTHDATE: [birth year]
 
 
DOS: 07/14/2019
 
ASSESSMENT:
1.  The patient with right lower lobe airspace density.  Dr. Srivastava consulted for
evaluation.
2.  Acute exacerbation of chronic obstructive pulmonary disease, being treated
with corticosteroids and antibiotics, improving.
3.  Severe peripheral arterial disease status post stenting and arterial
Dopplers showing normal flow.
4.  Nicotine smoke dependence.  The patient refused nicotine patch.
5.  Hypothyroidism, replaced with levothyroxine.
6.  Mixed hyperlipidemia, treated with simvastatin.
 
 
 
_________________________________
CARLA PUENTE MD
 
CM:PNTRANS
 
D: 07/14/19 1733                 
T: 07/15/19 0146
             
                                                            
CARLA PUENTE MD            
                                                            
07/15/19
1854
                              
interface

## 2019-07-11 NOTE — DS
[place], [place]
 
                                    DISCHARGE SUMMARY
 
        NAME: ERIC SINGH                [id number]  
        UNIT #: [id number]                        ROOM: 516       
        DOCTOR: AMBER NUNN MD                BIRTHDATE: [birth year]
 
 
DOS: 07/17/2019
 
DIAGNOSES:
1.  Acute exacerbation of chronic obstructive pulmonary disease.
2.  Acute hypoxic respiratory failure, resolved.
3.  Lactic acidosis.
4.  Right lower lobe pneumonia with a nodular density, status post bronchoscopy
with negative cultures.
5.  History of cerebrovascular accident.
6.  Peripheral vascular disease, status post angioplasty.
7.  Rheumatoid arthritis.
8.  Primary osteoarthritis with chronic low back pain.
9.  Hypothyroidism.
10.  Mixed hyperlipidemia.
 
DISCHARGE MEDICATIONS:  Medications on discharge will be doxycycline 100 mg p.o.
b.i.d. for 7 days, Plavix 75 daily, simvastatin 40 daily, aspirin 81 mg daily,
Norco 5/325 q.4 hours p.r.n., omeprazole 40 daily, prednisone tapering dose,
levothyroxine 100 mcg daily, DuoNeb q.4 hours.  Breathing treatments with DuoNeb
q.6 hours.
 
HOSPITAL COURSE:  This patient is 68 years old, very well known to us, comes in
with complaints of difficulty breathing.  She had been to the Emergency Room 2
days prior to this admission, was prescribed antibiotics and steroids, started
the medication without any improvement, decided to come into the office.  By the
time she arrived in the office, she was significantly tachypneic and in moderate
respiratory distress with hypoxemia with an oxygen saturation of 90 on room air.
 She was sent to the ER since there were no beds available.  On visit in the ER,
she was admitted.  After admission, she was placed on IV steroids, antibiotics,
breathing treatments and oxygen supplementation.
 
She did have evident lactic acidosis.  CT of the chest was obtained, which
showed pulmonary small pneumonia, bronchial narrowing.  Dr. Srivastava was consulted. 
Bronchoscopy was performed.  Bronch cultures so far shows no bacterial growth. 
The patient needs to start antibiotics and continue that for 10 days and repeat
a PET scan as an outpatient to see whether there are any abnormalities, because
there is a possibility of a nodule.  The patient is also encouraged to quit
smoking.  The patient states that she did not want any medicines to quit smoking
because she states that she has already quitted and is not interested in smoking
any longer.
 
 
 
 
                              [place], [place]
 
                                    DISCHARGE SUMMARY
 
        NAME: ERIC SINGH                ACCT #: [id number]  
        UNIT #: [id number]                        ROOM: 516       
        DOCTOR: AMBER NUNN MD                BIRTHDATE: [birth year]
 
 
_________________________________
AMBER NUNN MD
 
CM:ZAHRA
 
D: 07/17/19 0700
T: 07/17/19 0711
    
                                                            
AMBER NUNN MD               
                                                            
07/17/19
1655
                              
interface

## 2019-07-12 VITALS — DIASTOLIC BLOOD PRESSURE: 76 MMHG | SYSTOLIC BLOOD PRESSURE: 148 MMHG

## 2019-07-12 VITALS — DIASTOLIC BLOOD PRESSURE: 75 MMHG

## 2019-07-12 VITALS — DIASTOLIC BLOOD PRESSURE: 76 MMHG

## 2019-07-12 VITALS — DIASTOLIC BLOOD PRESSURE: 81 MMHG | SYSTOLIC BLOOD PRESSURE: 136 MMHG

## 2019-07-12 VITALS — SYSTOLIC BLOOD PRESSURE: 126 MMHG | DIASTOLIC BLOOD PRESSURE: 76 MMHG

## 2019-07-12 NOTE — NUR
in to talk to patient.
Patient states lives at HOME with ALONE.
There are BASEMENT steps in the home.
Physician: ARLINE
[facility]acy: [facility]
Home health services: NONE
Patient's level of ADLs: INDEPENDENT
Patient has working utilities: YES
DME: NONE
Follow-up physician's appointment after d/c: WILL BE MADE BY HOSPITALIST NURSE
DIRECTOR ON DISCHARGE
Does patient want to access PORTAL?: NO
Discharge plan PT STATES SHE LIVES AT HOME ALONE AND IS INDEPENDENT IN HER
CARE.  DENIES SHE HAS NEEDS AT HOME.  STATES SHE WILL RETURN HOME ON DISCHARGE
WILL CONTINUE TO FOLLOW.  WILL HAVE A RIDE HOME ON DISCHARGE..
 
LONG,CAMRON

## 2019-07-12 NOTE — NUR
PRN Tylenol given for c/o headache, patient tolerated well. Call light is
within reach, will monitor.

## 2019-07-12 NOTE — NUR
PRN NORCO GIVEN FOR PT COMPLAINTS OF BACK PAIN RATING IT 7/10. CALL LIGHT
WITHIN REACH, WILL MONITOR

## 2019-07-12 NOTE — NUR
Patient arouses easily for administration of 0600 medications. Patient
tolerated well, call light is within reach.

## 2019-07-13 VITALS — DIASTOLIC BLOOD PRESSURE: 68 MMHG | SYSTOLIC BLOOD PRESSURE: 136 MMHG

## 2019-07-13 VITALS — DIASTOLIC BLOOD PRESSURE: 80 MMHG | SYSTOLIC BLOOD PRESSURE: 144 MMHG

## 2019-07-13 VITALS — SYSTOLIC BLOOD PRESSURE: 124 MMHG | DIASTOLIC BLOOD PRESSURE: 58 MMHG

## 2019-07-13 VITALS — DIASTOLIC BLOOD PRESSURE: 71 MMHG

## 2019-07-13 VITALS — DIASTOLIC BLOOD PRESSURE: 68 MMHG | SYSTOLIC BLOOD PRESSURE: 149 MMHG

## 2019-07-13 NOTE — NUR
Patient requested and was medicated as ordered PRN [place] 5/325mg PO for
complaint of pain to back rated per patient as a 7.5 on a 1-10 scale. Will
monitor patient for effectiveness.

## 2019-07-13 NOTE — NUR
Reassessed patient for pain, patient rates pain as a 3 on 1-10
scale at this time.  Pain medication appears effective.  Will continue to
monitor.
Ondina Hough OV[employer] Student
Flor Wilson OV[employer] instructor

## 2019-07-14 VITALS — SYSTOLIC BLOOD PRESSURE: 130 MMHG | DIASTOLIC BLOOD PRESSURE: 65 MMHG

## 2019-07-14 VITALS — DIASTOLIC BLOOD PRESSURE: 68 MMHG

## 2019-07-14 VITALS — DIASTOLIC BLOOD PRESSURE: 60 MMHG

## 2019-07-14 VITALS — DIASTOLIC BLOOD PRESSURE: 76 MMHG

## 2019-07-14 NOTE — NUR
GOOD EFFECT FROM NORCO GIVEN AT 2259, AS EVIDENCED BY PATIENT RESTING QUIETLY
WITH EYES CLOSED, RESPIRATIONS EVEN AND NON LABORED.

## 2019-07-14 NOTE — NUR
ARRIVED ON SHIFT, INTRODUCED TO PATIENT, BEDSIDE REPORT RECIEVED, WHITE BOARD
UPDATED, NO NEEDS VOICED AT THIS TIME.

## 2019-07-14 NOTE — NUR
PATIENT C/O BACK PAIN WHICH SHE STATES IS 8 OF 10, SHARP, SHOOTING PAIN
MEDICATED WITH HYDROCODONE/APAP 5/325MG AS ORDERED.

## 2019-07-15 VITALS — DIASTOLIC BLOOD PRESSURE: 64 MMHG | SYSTOLIC BLOOD PRESSURE: 132 MMHG

## 2019-07-15 VITALS — SYSTOLIC BLOOD PRESSURE: 136 MMHG | DIASTOLIC BLOOD PRESSURE: 62 MMHG

## 2019-07-15 VITALS — DIASTOLIC BLOOD PRESSURE: 61 MMHG

## 2019-07-15 VITALS — DIASTOLIC BLOOD PRESSURE: 60 MMHG | SYSTOLIC BLOOD PRESSURE: 130 MMHG

## 2019-07-15 NOTE — NUR
REQUESTED AND RECEIVED NORCO PER PRN ORDER FOR COMPLAINTS OF BACK PAIN RATING
AN 8. CALL LIGHT WITHIN REACH. WILL MONITOR FOR EFFECTIVENESS

## 2019-07-15 NOTE — NUR
RESTING IN BED WITH NO DISTRESS NOTED. RESPIRATIONS EASY. LUNGS DIMINISHED
WITH I&E WHEEZES. ROOM AIR, DENIES SOB. CALL LIGHT WITHIN REACH. NO VOICED
COMPLAINTS

## 2019-07-16 VITALS — DIASTOLIC BLOOD PRESSURE: 63 MMHG | SYSTOLIC BLOOD PRESSURE: 122 MMHG

## 2019-07-16 VITALS — DIASTOLIC BLOOD PRESSURE: 63 MMHG | SYSTOLIC BLOOD PRESSURE: 127 MMHG

## 2019-07-16 VITALS — DIASTOLIC BLOOD PRESSURE: 96 MMHG | SYSTOLIC BLOOD PRESSURE: 148 MMHG

## 2019-07-16 VITALS — DIASTOLIC BLOOD PRESSURE: 64 MMHG

## 2019-07-16 VITALS — SYSTOLIC BLOOD PRESSURE: 136 MMHG | DIASTOLIC BLOOD PRESSURE: 66 MMHG

## 2019-07-16 VITALS — DIASTOLIC BLOOD PRESSURE: 55 MMHG | SYSTOLIC BLOOD PRESSURE: 122 MMHG

## 2019-07-16 VITALS — DIASTOLIC BLOOD PRESSURE: 57 MMHG

## 2019-07-16 VITALS — DIASTOLIC BLOOD PRESSURE: 79 MMHG | SYSTOLIC BLOOD PRESSURE: 137 MMHG

## 2019-07-16 LAB
CCP IGA+IGG SERPL IA-ACNC: 7 UNITS (ref 0–19)
RHEUMATOID FACT SERPL-ACNC: 434.9 IU/ML (ref 0–13.9)

## 2019-07-16 PROCEDURE — 0BC68ZZ EXTIRPATION OF MATTER FROM RIGHT LOWER LOBE BRONCHUS, VIA NATURAL OR ARTIFICIAL OPENING ENDOSCOPIC: ICD-10-PCS | Performed by: INTERNAL MEDICINE

## 2019-07-16 PROCEDURE — 0BCB8ZZ EXTIRPATION OF MATTER FROM LEFT LOWER LOBE BRONCHUS, VIA NATURAL OR ARTIFICIAL OPENING ENDOSCOPIC: ICD-10-PCS | Performed by: INTERNAL MEDICINE

## 2019-07-16 PROCEDURE — 0BC48ZZ EXTIRPATION OF MATTER FROM RIGHT UPPER LOBE BRONCHUS, VIA NATURAL OR ARTIFICIAL OPENING ENDOSCOPIC: ICD-10-PCS | Performed by: INTERNAL MEDICINE

## 2019-07-16 PROCEDURE — 0BC58ZZ EXTIRPATION OF MATTER FROM RIGHT MIDDLE LOBE BRONCHUS, VIA NATURAL OR ARTIFICIAL OPENING ENDOSCOPIC: ICD-10-PCS | Performed by: INTERNAL MEDICINE

## 2019-07-16 PROCEDURE — 0BC98ZZ EXTIRPATION OF MATTER FROM LINGULA BRONCHUS, VIA NATURAL OR ARTIFICIAL OPENING ENDOSCOPIC: ICD-10-PCS | Performed by: INTERNAL MEDICINE

## 2019-07-16 PROCEDURE — 0BC88ZZ EXTIRPATION OF MATTER FROM LEFT UPPER LOBE BRONCHUS, VIA NATURAL OR ARTIFICIAL OPENING ENDOSCOPIC: ICD-10-PCS | Performed by: INTERNAL MEDICINE

## 2019-07-16 PROCEDURE — 0BC28ZZ EXTIRPATION OF MATTER FROM CARINA, VIA NATURAL OR ARTIFICIAL OPENING ENDOSCOPIC: ICD-10-PCS | Performed by: INTERNAL MEDICINE

## 2019-07-16 NOTE — NUR
REQUESTED AND RECEIVED NORCO PER PRN ORDER FOR COMPLAINTS OF BACK PAIN RATING
A 9. CALL LIGHT WITHIN REACH. WILL MONITOR FOR EFFECTIVENESS

## 2019-07-16 NOTE — NUR
SLEEPING, AWAKENS EASILY. RESPIRATIONS EASY. LUNGS DIMINISHED WITH SCATTERED
WHEEZES. PULSE OX 96% RA. NON-PROD COUGH. CALL LIGHT WITHIN REACH. NO VOICED
COMPLAINTS

## 2019-07-17 VITALS — SYSTOLIC BLOOD PRESSURE: 134 MMHG | DIASTOLIC BLOOD PRESSURE: 66 MMHG

## 2019-07-17 LAB — SPECIMEN PREPARATION: (no result)

## 2019-07-17 NOTE — NUR
SLEPT THROUGHOUT NIGHT WITH NO DISTRESS NOTED. RESPIRATIONS EASY. CALL LIGHT
WITHIN REACH. NO VOICED COMPLAINTS THIS SHIFT

## 2019-07-17 NOTE — NUR
Discharge instructions reviewed with patient/family. Patient receptive and
verbalizes understanding. Follow-up care arranged. Written instructions given
to patient/family.
JAZMÍN COTTER.

## 2019-07-17 NOTE — NUR
MEDICATED WITH NORCO PER PRN ORDER FOR COMPLAINTS OF BACK PAIN RATING AN 8.
CALL LIGHT WITHIN REACH. WILL MONITOR FOR EFFECTIVENESS

## 2019-09-18 ENCOUNTER — HOSPITAL ENCOUNTER (OUTPATIENT)
Dept: HOSPITAL 83 - SDC | Age: 69
Discharge: HOME | End: 2019-09-18
Attending: INTERNAL MEDICINE
Payer: COMMERCIAL

## 2019-09-18 VITALS — WEIGHT: 109 LBS | BODY MASS INDEX: 21.97 KG/M2 | HEIGHT: 58.98 IN

## 2019-09-18 VITALS — SYSTOLIC BLOOD PRESSURE: 143 MMHG | DIASTOLIC BLOOD PRESSURE: 114 MMHG

## 2019-09-18 VITALS — DIASTOLIC BLOOD PRESSURE: 72 MMHG | SYSTOLIC BLOOD PRESSURE: 132 MMHG

## 2019-09-18 VITALS — SYSTOLIC BLOOD PRESSURE: 114 MMHG | DIASTOLIC BLOOD PRESSURE: 67 MMHG

## 2019-09-18 DIAGNOSIS — R05: ICD-10-CM

## 2019-09-18 DIAGNOSIS — R91.1: Primary | ICD-10-CM

## 2019-09-18 DIAGNOSIS — Z90.49: ICD-10-CM

## 2019-09-18 DIAGNOSIS — Z80.1: ICD-10-CM

## 2019-09-18 DIAGNOSIS — F32.9: ICD-10-CM

## 2019-09-18 DIAGNOSIS — J44.9: ICD-10-CM

## 2019-09-18 DIAGNOSIS — Z98.890: ICD-10-CM

## 2019-09-18 DIAGNOSIS — Z83.3: ICD-10-CM

## 2019-09-18 DIAGNOSIS — Z79.899: ICD-10-CM

## 2019-09-18 DIAGNOSIS — Z82.49: ICD-10-CM

## 2019-09-18 DIAGNOSIS — Z87.891: ICD-10-CM

## 2019-09-18 DIAGNOSIS — I25.10: ICD-10-CM

## 2019-09-18 DIAGNOSIS — Z86.73: ICD-10-CM

## 2019-09-18 DIAGNOSIS — K21.9: ICD-10-CM

## 2019-09-18 DIAGNOSIS — E78.5: ICD-10-CM

## 2019-09-19 LAB — SPECIMEN PREPARATION: (no result)

## 2019-09-23 ENCOUNTER — HOSPITAL ENCOUNTER (OUTPATIENT)
Dept: HOSPITAL 83 - CT | Age: 69
Discharge: HOME | End: 2019-09-23
Attending: INTERNAL MEDICINE
Payer: COMMERCIAL

## 2019-09-23 DIAGNOSIS — R09.89: ICD-10-CM

## 2019-09-23 DIAGNOSIS — R91.1: Primary | ICD-10-CM

## 2019-09-23 DIAGNOSIS — R05: ICD-10-CM

## 2019-09-24 ENCOUNTER — HOSPITAL ENCOUNTER (INPATIENT)
Dept: HOSPITAL 83 - 4E | Age: 69
LOS: 4 days | Discharge: HOME | DRG: 190 | End: 2019-09-28
Attending: INTERNAL MEDICINE | Admitting: INTERNAL MEDICINE
Payer: COMMERCIAL

## 2019-09-24 VITALS — BODY MASS INDEX: 21.58 KG/M2 | WEIGHT: 107.06 LBS | HEIGHT: 59 IN

## 2019-09-24 VITALS — SYSTOLIC BLOOD PRESSURE: 130 MMHG | DIASTOLIC BLOOD PRESSURE: 64 MMHG

## 2019-09-24 VITALS — SYSTOLIC BLOOD PRESSURE: 124 MMHG | DIASTOLIC BLOOD PRESSURE: 75 MMHG

## 2019-09-24 DIAGNOSIS — F33.1: ICD-10-CM

## 2019-09-24 DIAGNOSIS — J44.1: ICD-10-CM
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
## 2019-09-24 DIAGNOSIS — E78.00: ICD-10-CM

## 2019-09-24 DIAGNOSIS — F17.210: ICD-10-CM

## 2019-09-24 DIAGNOSIS — Z71.6: ICD-10-CM

## 2019-09-24 DIAGNOSIS — I73.9: ICD-10-CM

## 2019-09-24 DIAGNOSIS — Z90.49: ICD-10-CM

## 2019-09-24 DIAGNOSIS — Z98.891: ICD-10-CM

## 2019-09-24 DIAGNOSIS — M06.9: ICD-10-CM

## 2019-09-24 DIAGNOSIS — Z90.710: ICD-10-CM
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
## 2019-09-24 DIAGNOSIS — J20.9: ICD-10-CM
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
## 2019-09-24 DIAGNOSIS — E03.9: ICD-10-CM

## 2019-09-24 DIAGNOSIS — E78.2: ICD-10-CM

## 2019-09-24 DIAGNOSIS — Z86.73: ICD-10-CM

## 2019-09-24 DIAGNOSIS — I25.10: ICD-10-CM

## 2019-09-24 DIAGNOSIS — M54.5: ICD-10-CM

## 2019-09-24 DIAGNOSIS — Z88.0: ICD-10-CM

## 2019-09-24 DIAGNOSIS — T17.590A: ICD-10-CM

## 2019-09-24 DIAGNOSIS — M15.9: ICD-10-CM

## 2019-09-24 DIAGNOSIS — J18.1: ICD-10-CM

## 2019-09-24 DIAGNOSIS — Z80.8: ICD-10-CM

## 2019-09-24 DIAGNOSIS — Z83.3: ICD-10-CM

## 2019-09-24 DIAGNOSIS — Z87.01: ICD-10-CM

## 2019-09-24 DIAGNOSIS — G89.29: ICD-10-CM

## 2019-09-24 DIAGNOSIS — C34.90: ICD-10-CM

## 2019-09-24 DIAGNOSIS — Z82.49: ICD-10-CM

## 2019-09-24 DIAGNOSIS — J44.0: Primary | ICD-10-CM

## 2019-09-24 LAB
BASOPHILS # BLD AUTO: 0.1 10*3/UL (ref 0–0.1)
BASOPHILS NFR BLD AUTO: 0.7 % (ref 0–1)
BUN SERPL-MCNC: 20 MG/DL (ref 7–24)
CHLORIDE SERPL-SCNC: 105 MMOL/L (ref 98–107)
CREAT SERPL-MCNC: 0.71 MG/DL (ref 0.55–1.02)
EOSINOPHIL # BLD AUTO: 0.2 10*3/UL (ref 0–0.4)
EOSINOPHIL # BLD AUTO: 1.4 % (ref 1–4)
ERYTHROCYTE [DISTWIDTH] IN BLOOD BY AUTOMATED COUNT: 16.5 % (ref 0–14.5)
HCT VFR BLD AUTO: 33.4 % (ref 37–47)
HGB BLD-MCNC: 10 G/DL (ref 12–16)
LYMPHOCYTES # BLD AUTO: 2.7 10*3/UL (ref 1.3–4.4)
LYMPHOCYTES NFR BLD AUTO: 19.9 % (ref 27–41)
MCH RBC QN AUTO: 24.5 PG (ref 27–31)
MCHC RBC AUTO-ENTMCNC: 29.9 G/DL (ref 33–37)
MCV RBC AUTO: 81.9 FL (ref 81–99)
MONOCYTES # BLD AUTO: 0.8 10*3/UL (ref 0.1–1)
MONOCYTES NFR BLD MANUAL: 5.8 % (ref 3–9)
NEUT #: 9.6 10*3/UL (ref 2.3–7.9)
NEUT %: 71.5 % (ref 47–73)
NRBC BLD QL AUTO: 0 10*3/UL (ref 0–0)
PLATELET # BLD AUTO: 551 10*3/UL (ref 130–400)
PMV BLD AUTO: 10.5 FL (ref 9.6–12.3)
POTASSIUM SERPL-SCNC: 4.2 MMOL/L (ref 3.5–5.1)
RBC # BLD AUTO: 4.08 10*6/UL (ref 4.1–5.1)
SODIUM SERPL-SCNC: 137 MMOL/L (ref 136–145)
WBC NRBC COR # BLD AUTO: 13.4 10*3/UL (ref 4.8–10.8)

## 2019-09-24 NOTE — NUR
Per request of pharmacy, contacted Dr. Srivastava to notify of allergy to PCN. Per
physician, order Merrem and pharmacy to dose. See new orders.

## 2019-09-24 NOTE — NUR
A 68, admitted to , under the
services of AMBER Lujan MD with a diagnosis of Pneumonia.
Chief complaint is multiple.
Patient arrived via ambulatory from [place].
Monitor applied. Initial assessment completed.
Vital signs taken and recorded.
AMBER LUJAN MD notified of admission to the unit.
Orders received.
See assessment for past medical history, medications
and allergies.
Patient and/or family oriented to unit. [address]
visitation policy reviewed.
Clothing/patient valuable form completed.
 
TING GARCIA

## 2019-09-25 VITALS — DIASTOLIC BLOOD PRESSURE: 63 MMHG | SYSTOLIC BLOOD PRESSURE: 115 MMHG

## 2019-09-25 VITALS — DIASTOLIC BLOOD PRESSURE: 64 MMHG

## 2019-09-25 VITALS — DIASTOLIC BLOOD PRESSURE: 53 MMHG

## 2019-09-25 NOTE — NUR
in to talk to patient.
Patient states lives at home alone with family checking in on her.
There are 3 porch steps and basement steps in the home.
Physician: Dr. Karen Tejeda
[facility]
Home health services: none
Patient's level of ADLs: INDEPENDENT
Patient has working utilities: yes
DME: nebulizer
Follow-up physician's appointment after d/c: she prefers to make her own
follow up appt after discharge
Does patient want to access PORTAL?: no
Discharge plan discussed with patient. She lives at home alone with her
family checking in on her. She is independent in her ADLs and ambulation.
Discussed home health care services and she denies any home needs at this
time. When medically stable she will be discharged to home. Her brother-in-law
will transport on discharge.
 
Sarah Brown

## 2019-09-26 VITALS — DIASTOLIC BLOOD PRESSURE: 53 MMHG

## 2019-09-26 VITALS — DIASTOLIC BLOOD PRESSURE: 64 MMHG

## 2019-09-26 VITALS — DIASTOLIC BLOOD PRESSURE: 62 MMHG | SYSTOLIC BLOOD PRESSURE: 120 MMHG

## 2019-09-26 VITALS — DIASTOLIC BLOOD PRESSURE: 72 MMHG

## 2019-09-26 VITALS — DIASTOLIC BLOOD PRESSURE: 76 MMHG

## 2019-09-26 NOTE — NUR
PATIENT MEDICATED WITH NORCO 7.5 AS PER PRN ORDER FOR C/O BACK PAIN.  RATED
PAIN A 6/10 WITH 10 BEING THE WORST.  SEE EMAR.  REINFORCED USE OF CALL LIGHT

## 2019-09-26 NOTE — NUR
in to see patient. No new needs or request at this time. She
denies any home needs. When medically stable she will be discharged to home.

## 2019-09-27 VITALS — DIASTOLIC BLOOD PRESSURE: 66 MMHG

## 2019-09-27 VITALS — DIASTOLIC BLOOD PRESSURE: 49 MMHG

## 2019-09-27 VITALS — DIASTOLIC BLOOD PRESSURE: 60 MMHG | SYSTOLIC BLOOD PRESSURE: 109 MMHG

## 2019-09-27 VITALS — SYSTOLIC BLOOD PRESSURE: 119 MMHG | DIASTOLIC BLOOD PRESSURE: 62 MMHG

## 2019-09-27 VITALS — DIASTOLIC BLOOD PRESSURE: 50 MMHG | SYSTOLIC BLOOD PRESSURE: 121 MMHG

## 2019-09-27 VITALS — DIASTOLIC BLOOD PRESSURE: 62 MMHG

## 2019-09-27 VITALS — DIASTOLIC BLOOD PRESSURE: 58 MMHG

## 2019-09-27 VITALS — DIASTOLIC BLOOD PRESSURE: 65 MMHG

## 2019-09-27 PROCEDURE — 0BC58ZZ EXTIRPATION OF MATTER FROM RIGHT MIDDLE LOBE BRONCHUS, VIA NATURAL OR ARTIFICIAL OPENING ENDOSCOPIC: ICD-10-PCS | Performed by: INTERNAL MEDICINE

## 2019-09-27 PROCEDURE — 0BC68ZZ EXTIRPATION OF MATTER FROM RIGHT LOWER LOBE BRONCHUS, VIA NATURAL OR ARTIFICIAL OPENING ENDOSCOPIC: ICD-10-PCS | Performed by: INTERNAL MEDICINE

## 2019-09-27 PROCEDURE — 0BC78ZZ EXTIRPATION OF MATTER FROM LEFT MAIN BRONCHUS, VIA NATURAL OR ARTIFICIAL OPENING ENDOSCOPIC: ICD-10-PCS | Performed by: INTERNAL MEDICINE

## 2019-09-27 PROCEDURE — 0BC48ZZ EXTIRPATION OF MATTER FROM RIGHT UPPER LOBE BRONCHUS, VIA NATURAL OR ARTIFICIAL OPENING ENDOSCOPIC: ICD-10-PCS | Performed by: INTERNAL MEDICINE

## 2019-09-27 PROCEDURE — 0BC38ZZ EXTIRPATION OF MATTER FROM RIGHT MAIN BRONCHUS, VIA NATURAL OR ARTIFICIAL OPENING ENDOSCOPIC: ICD-10-PCS | Performed by: INTERNAL MEDICINE

## 2019-09-27 PROCEDURE — 0BC18ZZ EXTIRPATION OF MATTER FROM TRACHEA, VIA NATURAL OR ARTIFICIAL OPENING ENDOSCOPIC: ICD-10-PCS | Performed by: INTERNAL MEDICINE

## 2019-09-27 PROCEDURE — 0BC88ZZ EXTIRPATION OF MATTER FROM LEFT UPPER LOBE BRONCHUS, VIA NATURAL OR ARTIFICIAL OPENING ENDOSCOPIC: ICD-10-PCS | Performed by: INTERNAL MEDICINE

## 2019-09-27 PROCEDURE — 0BC98ZZ EXTIRPATION OF MATTER FROM LINGULA BRONCHUS, VIA NATURAL OR ARTIFICIAL OPENING ENDOSCOPIC: ICD-10-PCS | Performed by: INTERNAL MEDICINE

## 2019-09-27 PROCEDURE — 0BCB8ZZ EXTIRPATION OF MATTER FROM LEFT LOWER LOBE BRONCHUS, VIA NATURAL OR ARTIFICIAL OPENING ENDOSCOPIC: ICD-10-PCS | Performed by: INTERNAL MEDICINE

## 2019-09-27 NOTE — NUR
MEDICATED WITH NORCO PER PRN ORDER FOR COMPLAINTS OF LOWER BACK PAIN, RATES
PAIN 8/10. WILL MONITOR FOR EFFECTIVENESS.

## 2019-09-27 NOTE — NUR
PATIENT MEDICATED WITH NORCO AS PER PRN ORDER FOR C/O BACK PAIN.  RATED PAIN A
7/10 WITH 10 BEING THE WORST.  SEE EMAR.  REINFORCED USE OF CALL LIGHT.

## 2019-09-27 NOTE — NUR
PT STATES EARLIER NORCO HELPED WITH BACK PAIN, BUT STATES SHE DOES HAVE A
HEADACHE. MEDICATED WITH TYLENOL. WILL MONITOR FOR EFFECTIVENESS.

## 2019-09-28 VITALS — DIASTOLIC BLOOD PRESSURE: 66 MMHG | SYSTOLIC BLOOD PRESSURE: 121 MMHG

## 2019-09-28 VITALS — DIASTOLIC BLOOD PRESSURE: 53 MMHG

## 2019-09-28 LAB
BUN SERPL-MCNC: 12 MG/DL (ref 7–24)
CREAT SERPL-MCNC: 0.6 MG/DL (ref 0.55–1.02)
SPECIMEN PREPARATION: (no result)

## 2019-09-28 NOTE — NUR
Discharge instructions reviewed with patient. Patient receptive and
verbalizes understanding. Follow-up care arranged. Written instructions given
to patient.  PATIENT IS CALLING FOR A RIDE HOME, DOES NOT WANT THE LAST DOSES
OF ANTIBIOTIC, WILL START THE PO ANTIBIOTIC AT HOME, HEPLOCK REMOVED. MORNING
PO MEDS ADMINISTERED.

## 2019-11-25 ENCOUNTER — HOSPITAL ENCOUNTER (OUTPATIENT)
Dept: HOSPITAL 83 - LAB | Age: 69
Discharge: HOME | End: 2019-11-25
Attending: OTOLARYNGOLOGY
Payer: COMMERCIAL

## 2019-11-25 DIAGNOSIS — K11.8: Primary | ICD-10-CM

## 2019-11-25 LAB — CREAT SERPL-MCNC: 0.75 MG/DL (ref 0.55–1.02)

## 2020-01-13 ENCOUNTER — HOSPITAL ENCOUNTER (OUTPATIENT)
Dept: HOSPITAL 83 - SDC | Age: 70
Discharge: HOME | End: 2020-01-13
Attending: INTERNAL MEDICINE
Payer: COMMERCIAL

## 2020-01-13 VITALS — DIASTOLIC BLOOD PRESSURE: 75 MMHG

## 2020-01-13 VITALS — SYSTOLIC BLOOD PRESSURE: 141 MMHG | DIASTOLIC BLOOD PRESSURE: 67 MMHG

## 2020-01-13 VITALS — HEIGHT: 58.98 IN | WEIGHT: 109 LBS | BODY MASS INDEX: 21.97 KG/M2

## 2020-01-13 VITALS — DIASTOLIC BLOOD PRESSURE: 67 MMHG

## 2020-01-13 VITALS — DIASTOLIC BLOOD PRESSURE: 66 MMHG

## 2020-01-13 DIAGNOSIS — Z87.891: ICD-10-CM

## 2020-01-13 DIAGNOSIS — A31.0: ICD-10-CM

## 2020-01-13 DIAGNOSIS — Z82.49: ICD-10-CM

## 2020-01-13 DIAGNOSIS — I25.10: ICD-10-CM

## 2020-01-13 DIAGNOSIS — Z80.1: ICD-10-CM

## 2020-01-13 DIAGNOSIS — R59.0: ICD-10-CM

## 2020-01-13 DIAGNOSIS — R05: ICD-10-CM

## 2020-01-13 DIAGNOSIS — K21.9: ICD-10-CM

## 2020-01-13 DIAGNOSIS — Z86.73: ICD-10-CM

## 2020-01-13 DIAGNOSIS — J44.1: Primary | ICD-10-CM

## 2020-01-13 DIAGNOSIS — Z98.890: ICD-10-CM

## 2020-01-13 DIAGNOSIS — Z80.8: ICD-10-CM

## 2020-01-13 DIAGNOSIS — F32.9: ICD-10-CM

## 2020-01-14 LAB — SPECIMEN PREPARATION: (no result)

## 2020-01-15 ENCOUNTER — HOSPITAL ENCOUNTER (INPATIENT)
Dept: HOSPITAL 83 - 4E | Age: 70
LOS: 3 days | Discharge: HOME | DRG: 178 | End: 2020-01-18
Attending: INTERNAL MEDICINE | Admitting: INTERNAL MEDICINE
Payer: COMMERCIAL

## 2020-01-15 VITALS — SYSTOLIC BLOOD PRESSURE: 128 MMHG | DIASTOLIC BLOOD PRESSURE: 65 MMHG

## 2020-01-15 VITALS — DIASTOLIC BLOOD PRESSURE: 60 MMHG | SYSTOLIC BLOOD PRESSURE: 116 MMHG

## 2020-01-15 VITALS — DIASTOLIC BLOOD PRESSURE: 60 MMHG

## 2020-01-15 VITALS — WEIGHT: 112 LBS | BODY MASS INDEX: 22.58 KG/M2 | HEIGHT: 59 IN

## 2020-01-15 DIAGNOSIS — Z80.9: ICD-10-CM

## 2020-01-15 DIAGNOSIS — A31.0: Primary | ICD-10-CM

## 2020-01-15 DIAGNOSIS — M06.9: ICD-10-CM

## 2020-01-15 DIAGNOSIS — Z83.3: ICD-10-CM

## 2020-01-15 DIAGNOSIS — Z98.62: ICD-10-CM

## 2020-01-15 DIAGNOSIS — F17.210: ICD-10-CM

## 2020-01-15 DIAGNOSIS — M54.5: ICD-10-CM

## 2020-01-15 DIAGNOSIS — J44.0: ICD-10-CM

## 2020-01-15 DIAGNOSIS — E03.9: ICD-10-CM

## 2020-01-15 DIAGNOSIS — J44.1: ICD-10-CM

## 2020-01-15 DIAGNOSIS — G89.29: ICD-10-CM

## 2020-01-15 DIAGNOSIS — E78.5: ICD-10-CM

## 2020-01-15 DIAGNOSIS — Z90.49: ICD-10-CM

## 2020-01-15 DIAGNOSIS — Z90.710: ICD-10-CM

## 2020-01-15 DIAGNOSIS — Z82.49: ICD-10-CM

## 2020-01-15 DIAGNOSIS — I25.10: ICD-10-CM

## 2020-01-15 DIAGNOSIS — Z86.73: ICD-10-CM

## 2020-01-15 DIAGNOSIS — J20.9: ICD-10-CM

## 2020-01-15 LAB
BASOPHILS # BLD AUTO: 0.1 10*3/UL (ref 0–0.1)
BASOPHILS NFR BLD AUTO: 0.7 % (ref 0–1)
BUN SERPL-MCNC: 17 MG/DL (ref 7–24)
CHLORIDE SERPL-SCNC: 108 MMOL/L (ref 98–107)
CREAT SERPL-MCNC: 0.73 MG/DL (ref 0.55–1.02)
EOSINOPHIL # BLD AUTO: 0.2 10*3/UL (ref 0–0.4)
EOSINOPHIL # BLD AUTO: 1.9 % (ref 1–4)
ERYTHROCYTE [DISTWIDTH] IN BLOOD BY AUTOMATED COUNT: 19.5 % (ref 0–14.5)
HCT VFR BLD AUTO: 29 % (ref 37–47)
HGB BLD-MCNC: 8.4 G/DL (ref 12–16)
LYMPHOCYTES # BLD AUTO: 2.3 10*3/UL (ref 1.3–4.4)
LYMPHOCYTES NFR BLD AUTO: 18.2 % (ref 27–41)
MCH RBC QN AUTO: 21.4 PG (ref 27–31)
MCHC RBC AUTO-ENTMCNC: 29 G/DL (ref 33–37)
MCV RBC AUTO: 73.8 FL (ref 81–99)
MONOCYTES # BLD AUTO: 0.8 10*3/UL (ref 0.1–1)
MONOCYTES NFR BLD MANUAL: 6.7 % (ref 3–9)
NEUT #: 8.9 10*3/UL (ref 2.3–7.9)
NEUT %: 71.8 % (ref 47–73)
NRBC BLD QL AUTO: 0 % (ref 0–0)
PLATELET # BLD AUTO: 487 10*3/UL (ref 130–400)
PMV BLD AUTO: 9.3 FL (ref 9.6–12.3)
POTASSIUM SERPL-SCNC: 4.5 MMOL/L (ref 3.5–5.1)
RBC # BLD AUTO: 3.93 10*6/UL (ref 4.1–5.1)
SODIUM SERPL-SCNC: 140 MMOL/L (ref 136–145)
WBC NRBC COR # BLD AUTO: 12.5 10*3/UL (ref 4.8–10.8)

## 2020-01-15 NOTE — NUR
A 69, admitted to , under the
services of AMBER Lujan MD with a diagnosis of COPD EXACERBATION.
Chief complaint is SHORTNESS OF BREATH.
Patient arrived via bed from [place].
Monitor applied. Initial assessment completed.
Vital signs taken and recorded.
AMBER LUJAN MD notified of admission to the unit.
Orders received.
See assessment for past medical history, medications
and allergies.
Patient and/or family oriented to unit. [address]
visitation policy reviewed.
Clothing/patient valuable form completed.
 
JANEEN ROBLES

## 2020-01-15 NOTE — NUR
PT MEDICATED WITH PO NORCO PER PRN ORDER FOR C/O CHRONIC LOWER BACK PAIN RATED
6/10. WILL MONITOR. CALL LIGHT IN REACH.

## 2020-01-15 NOTE — NUR
CALLED DR. NUNN MADE AWARE PT HAS ALLERGY TO PCN, AND PHARMACY CALLED
REGARDING IT. SHE STATES ITS OK TO GIVE.

## 2020-01-16 VITALS — DIASTOLIC BLOOD PRESSURE: 73 MMHG | SYSTOLIC BLOOD PRESSURE: 108 MMHG

## 2020-01-16 VITALS — DIASTOLIC BLOOD PRESSURE: 72 MMHG

## 2020-01-16 VITALS — DIASTOLIC BLOOD PRESSURE: 64 MMHG | SYSTOLIC BLOOD PRESSURE: 117 MMHG

## 2020-01-16 VITALS — DIASTOLIC BLOOD PRESSURE: 62 MMHG

## 2020-01-16 NOTE — NUR
PRN NORCO GIVEN FOR LOWER BACK PAIN. PT STATES THAT SHE HAS A BAD BACK AND THE
BEDS DO NOT HELP. WILL REASSESS EFFECTIVENESS.

## 2020-01-16 NOTE — NUR
EARLIER MEDS APPEAR EFFECTIVE. PT ASLEEP IN BED. RESPIRATIONS EASY. NO S/S OF
DISTRESS NOTED. WILL MONITOR. CALL LIGHT IN REACH.

## 2020-01-16 NOTE — NUR
in to talk to patient.
Patient states lives at home alone with family checking in on her.
There are 3 porch steps and basement steps in the home.
Physician: Dr. Karen Tejeda
[facility]
Home health services: none
Patient's level of ADLs: INDEPENDENT
Patient has working utilities: yes
DME: nebulizer
Follow-up physician's appointment after d/c: she prefers to make her own
follow up appt after discharge
Does patient want to access PORTAL?: no
Discharge plan discussed with patient. She lives at home alone with her
family checking in on her. She is independent in her ADLs and ambulation.
Discussed home health care services and she denies any home needs at this
time. When medically stable she will be discharged to home. Her sister or her
brother-in-law will provide transportation on discharge.
 
Sarah Brown

## 2020-01-17 VITALS — DIASTOLIC BLOOD PRESSURE: 61 MMHG

## 2020-01-17 VITALS — DIASTOLIC BLOOD PRESSURE: 73 MMHG | SYSTOLIC BLOOD PRESSURE: 137 MMHG

## 2020-01-17 VITALS — DIASTOLIC BLOOD PRESSURE: 46 MMHG | SYSTOLIC BLOOD PRESSURE: 114 MMHG

## 2020-01-17 VITALS — DIASTOLIC BLOOD PRESSURE: 60 MMHG

## 2020-01-17 VITALS — DIASTOLIC BLOOD PRESSURE: 62 MMHG | SYSTOLIC BLOOD PRESSURE: 133 MMHG

## 2020-01-17 LAB
BUN SERPL-MCNC: 20 MG/DL (ref 7–24)
CHLORIDE SERPL-SCNC: 108 MMOL/L (ref 98–107)
CREAT SERPL-MCNC: 0.7 MG/DL (ref 0.55–1.02)
ERYTHROCYTE [DISTWIDTH] IN BLOOD BY AUTOMATED COUNT: 19.8 % (ref 0–14.5)
HCT VFR BLD AUTO: 27.9 % (ref 37–47)
HGB BLD-MCNC: 8 G/DL (ref 12–16)
MCH RBC QN AUTO: 21.1 PG (ref 27–31)
MCHC RBC AUTO-ENTMCNC: 28.7 G/DL (ref 33–37)
MCV RBC AUTO: 73.4 FL (ref 81–99)
MICROCYTES BLD QL SMEAR: SLIGHT
NRBC BLD QL AUTO: 0 % (ref 0–0)
PLATELET # BLD AUTO: 574 10*3/UL (ref 130–400)
PLATELET SUFFICIENCY: (no result)
PMV BLD AUTO: 10.1 FL (ref 9.6–12.3)
POLYCHROMASIA BLD QL SMEAR: SLIGHT
POTASSIUM SERPL-SCNC: 4.5 MMOL/L (ref 3.5–5.1)
RBC # BLD AUTO: 3.8 10*6/UL (ref 4.1–5.1)
SODIUM SERPL-SCNC: 139 MMOL/L (ref 136–145)
TARGETS BLD QL SMEAR: (no result)
TOTAL CELLS COUNTED: 100 #CELLS
TOXIC GRANULES BLD QL SMEAR: SLIGHT
WBC NRBC COR # BLD AUTO: 26.3 10*3/UL (ref 4.8–10.8)

## 2020-01-17 NOTE — NUR
PT MEDICATED WITH PRN NORCO FOR BACK PAIN RATED A 6.5/10. WILL CHECK
EFFECTIVENESS. CALL LIGHT WITHIN REACH.

## 2020-01-17 NOTE — NUR
in to see patient. She is sitting in her bedside chair visiting
with her neighbor. No new needs or request at this time. She denies any home
needs. When medically stable she will be discharged to home.

## 2020-01-17 NOTE — NUR
IN TO SEE PT. ASSESSMENT COMPLETE. PT COMPLAINS OF BACK PAIN AND REQUESTING
SOMETHING FOR PAIN. NO OTHER COMPLAINTS AT THIS TIME. RESPIRATIONS EASY AND
REGULAR AT REST. SOB WITH EXERTION. CALL LIGHT WITHIN REACH. WILL CONTINUE TO
MONITOR.

## 2020-01-18 VITALS — DIASTOLIC BLOOD PRESSURE: 70 MMHG | SYSTOLIC BLOOD PRESSURE: 132 MMHG

## 2020-01-18 VITALS — DIASTOLIC BLOOD PRESSURE: 71 MMHG

## 2020-01-18 VITALS — DIASTOLIC BLOOD PRESSURE: 70 MMHG

## 2020-01-18 NOTE — NUR
PT CO LOWER BACK PAIN RATED AN 8/10. MEDICATED WITH PRN NORCO. WILL CHECK
EFFECTIVENESS. CALL LIGHT IN REACH.

## 2020-01-18 NOTE — NUR
NORCO GIVEN FOR CHRONIC BACK PAIN. K-PAD ALSO APPLIED AT THIS TIME. WILL
MONITOR. CALL LIGHT WITHIN REACH.

## 2020-01-18 NOTE — NUR
Discharge instructions reviewed with patient/family. Patient receptive and
verbalizes understanding. Follow-up care arranged. Written instructions given
to patient/family.
MELANY DAY

## 2020-02-10 ENCOUNTER — HOSPITAL ENCOUNTER (OUTPATIENT)
Dept: HOSPITAL 83 - CT | Age: 70
Discharge: HOME | End: 2020-02-10
Attending: INTERNAL MEDICINE
Payer: COMMERCIAL

## 2020-02-10 DIAGNOSIS — R91.1: Primary | ICD-10-CM

## 2020-04-06 ENCOUNTER — HOSPITAL ENCOUNTER (OUTPATIENT)
Dept: HOSPITAL 83 - RAD | Age: 70
Discharge: HOME | End: 2020-04-06
Attending: INTERNAL MEDICINE
Payer: COMMERCIAL

## 2020-04-06 DIAGNOSIS — R13.10: Primary | ICD-10-CM

## 2020-07-12 ENCOUNTER — HOSPITAL ENCOUNTER (EMERGENCY)
Dept: HOSPITAL 83 - ED | Age: 70
LOS: 1 days | Discharge: HOME | End: 2020-07-13
Payer: COMMERCIAL

## 2020-07-12 VITALS — BODY MASS INDEX: 20.96 KG/M2 | WEIGHT: 104 LBS | HEIGHT: 58.98 IN

## 2020-07-12 DIAGNOSIS — Z79.899: ICD-10-CM

## 2020-07-12 DIAGNOSIS — F17.200: ICD-10-CM

## 2020-07-12 DIAGNOSIS — J44.1: Primary | ICD-10-CM

## 2020-07-12 DIAGNOSIS — Z88.0: ICD-10-CM

## 2020-07-12 DIAGNOSIS — Z79.82: ICD-10-CM

## 2020-07-12 DIAGNOSIS — E78.00: ICD-10-CM

## 2020-07-12 LAB
ALBUMIN SERPL-MCNC: 3.4 GM/DL (ref 3.1–4.5)
ALP SERPL-CCNC: 111 U/L (ref 45–117)
ALT SERPL W P-5'-P-CCNC: 14 U/L (ref 12–78)
AST SERPL-CCNC: 13 IU/L (ref 3–35)
BASOPHILS # BLD AUTO: 0.1 10*3/UL (ref 0–0.1)
BASOPHILS NFR BLD AUTO: 0.6 % (ref 0–1)
BUN SERPL-MCNC: 23 MG/DL (ref 7–24)
CHLORIDE SERPL-SCNC: 106 MMOL/L (ref 98–107)
CREAT SERPL-MCNC: 0.85 MG/DL (ref 0.55–1.02)
EOSINOPHIL # BLD AUTO: 0.2 10*3/UL (ref 0–0.4)
EOSINOPHIL # BLD AUTO: 1.7 % (ref 1–4)
ERYTHROCYTE [DISTWIDTH] IN BLOOD BY AUTOMATED COUNT: 20.3 % (ref 0–14.5)
HCT VFR BLD AUTO: 28 % (ref 37–47)
INR BLD: 1 (ref 2–3.5)
LYMPHOCYTES # BLD AUTO: 2 10*3/UL (ref 1.3–4.4)
LYMPHOCYTES NFR BLD AUTO: 14.6 % (ref 27–41)
MCH RBC QN AUTO: 17.9 PG (ref 27–31)
MCHC RBC AUTO-ENTMCNC: 27.5 G/DL (ref 33–37)
MCV RBC AUTO: 65.1 FL (ref 81–99)
MONOCYTES # BLD AUTO: 0.8 10*3/UL (ref 0.1–1)
MONOCYTES NFR BLD MANUAL: 5.7 % (ref 3–9)
NEUT #: 10.7 10*3/UL (ref 2.3–7.9)
NEUT %: 76.8 % (ref 47–73)
NRBC BLD QL AUTO: 0 10*3/UL (ref 0–0)
PLATELET # BLD AUTO: 779 10*3/UL (ref 130–400)
PMV BLD AUTO: 9.1 FL (ref 9.6–12.3)
POTASSIUM SERPL-SCNC: 4.2 MMOL/L (ref 3.5–5.1)
PROT SERPL-MCNC: 7.6 GM/DL (ref 6.4–8.2)
RBC # BLD AUTO: 4.3 10*6/UL (ref 4.1–5.1)
SODIUM SERPL-SCNC: 138 MMOL/L (ref 136–145)
TROPONIN I SERPL-MCNC: < 0.015 NG/ML (ref ?–0.04)
WBC NRBC COR # BLD AUTO: 14 10*3/UL (ref 4.8–10.8)

## 2020-08-24 ENCOUNTER — HOSPITAL ENCOUNTER (OUTPATIENT)
Dept: HOSPITAL 83 - LAB | Age: 70
Discharge: HOME | End: 2020-08-24
Attending: INTERNAL MEDICINE
Payer: COMMERCIAL

## 2020-08-24 DIAGNOSIS — E86.0: Primary | ICD-10-CM

## 2020-08-24 LAB
BUN SERPL-MCNC: 23 MG/DL (ref 7–24)
CHLORIDE SERPL-SCNC: 107 MMOL/L (ref 98–107)
CREAT SERPL-MCNC: 0.9 MG/DL (ref 0.55–1.02)
POTASSIUM SERPL-SCNC: 4.7 MMOL/L (ref 3.5–5.1)
SODIUM SERPL-SCNC: 138 MMOL/L (ref 136–145)

## 2021-01-22 ENCOUNTER — HOSPITAL ENCOUNTER (OUTPATIENT)
Age: 71
Discharge: HOME | End: 2021-01-24

## 2021-01-22 PROCEDURE — 88305 TISSUE EXAM BY PATHOLOGIST: CPT

## 2021-01-22 PROCEDURE — 88342 IMHCHEM/IMCYTCHM 1ST ANTB: CPT

## 2021-01-22 PROCEDURE — 88341 IMHCHEM/IMCYTCHM EA ADD ANTB: CPT

## 2021-01-28 ENCOUNTER — HOSPITAL ENCOUNTER (OUTPATIENT)
Dept: HOSPITAL 83 - LAB | Age: 71
Discharge: HOME | End: 2021-01-28
Attending: INTERNAL MEDICINE
Payer: COMMERCIAL

## 2021-01-28 DIAGNOSIS — E55.9: ICD-10-CM

## 2021-01-28 DIAGNOSIS — E78.2: ICD-10-CM

## 2021-01-28 DIAGNOSIS — Z00.00: Primary | ICD-10-CM

## 2021-01-28 DIAGNOSIS — I10: ICD-10-CM

## 2021-01-28 DIAGNOSIS — E03.9: ICD-10-CM

## 2021-01-28 LAB
25(OH)D3 SERPL-MCNC: 22.2 NG/ML (ref 30–100)
ALBUMIN SERPL-MCNC: 3.6 GM/DL (ref 3.1–4.5)
ALP SERPL-CCNC: 92 U/L (ref 45–117)
ALT SERPL W P-5'-P-CCNC: 13 U/L (ref 12–78)
AST SERPL-CCNC: 12 IU/L (ref 3–35)
BASOPHILS # BLD AUTO: 0.1 10*3/UL (ref 0–0.1)
BASOPHILS NFR BLD AUTO: 0.9 % (ref 0–1)
BUN SERPL-MCNC: 18 MG/DL (ref 7–24)
CHLORIDE SERPL-SCNC: 107 MMOL/L (ref 98–107)
CHOLEST SERPL-MCNC: 142 MG/DL (ref ?–200)
CREAT SERPL-MCNC: 0.65 MG/DL (ref 0.55–1.02)
EOSINOPHIL # BLD AUTO: 0.3 10*3/UL (ref 0–0.4)
EOSINOPHIL # BLD AUTO: 3 % (ref 1–4)
ERYTHROCYTE [DISTWIDTH] IN BLOOD BY AUTOMATED COUNT: 22.2 % (ref 0–14.5)
HCT VFR BLD AUTO: 31.9 % (ref 37–47)
HDLC SERPL-MCNC: 28 MG/DL (ref 40–60)
LDLC SERPL DIRECT ASSAY-MCNC: 60 MG/DL (ref 9–159)
LYMPHOCYTES # BLD AUTO: 2.2 10*3/UL (ref 1.3–4.4)
LYMPHOCYTES NFR BLD AUTO: 21.7 % (ref 27–41)
MCH RBC QN AUTO: 16.5 PG (ref 27–31)
MCHC RBC AUTO-ENTMCNC: 25.7 G/DL (ref 33–37)
MCV RBC AUTO: 64.1 FL (ref 81–99)
MONOCYTES # BLD AUTO: 0.6 10*3/UL (ref 0.1–1)
MONOCYTES NFR BLD MANUAL: 6.3 % (ref 3–9)
NEUT #: 6.8 10*3/UL (ref 2.3–7.9)
NEUT %: 67.7 % (ref 47–73)
NRBC BLD QL AUTO: 0 % (ref 0–0)
PLATELET # BLD AUTO: 656 10*3/UL (ref 130–400)
PMV BLD AUTO: 9.3 FL (ref 9.6–12.3)
POTASSIUM SERPL-SCNC: 4.2 MMOL/L (ref 3.5–5.1)
PROT SERPL-MCNC: 7.4 GM/DL (ref 6.4–8.2)
RBC # BLD AUTO: 4.98 10*6/UL (ref 4.1–5.1)
SODIUM SERPL-SCNC: 139 MMOL/L (ref 136–145)
T4 FREE SERPL-MCNC: 1.86 NG/DL (ref 0.76–1.46)
TRIGL SERPL-MCNC: 271 MG/DL (ref ?–150)
TSH SERPL DL<=0.005 MIU/L-ACNC: 0.01 UIU/ML (ref 0.36–4.75)
VITAMIN B12: 395 PG/ML (ref 247–911)
VLDLC SERPL CALC-MCNC: 54 MG/DL (ref 6–40)
WBC NRBC COR # BLD AUTO: 10 10*3/UL (ref 4.8–10.8)

## 2021-02-03 ENCOUNTER — HOSPITAL ENCOUNTER (OUTPATIENT)
Dept: HOSPITAL 83 - LAB | Age: 71
Discharge: HOME | End: 2021-02-03
Attending: INTERNAL MEDICINE
Payer: COMMERCIAL

## 2021-02-03 DIAGNOSIS — D64.9: Primary | ICD-10-CM

## 2021-04-02 ENCOUNTER — HOSPITAL ENCOUNTER (OUTPATIENT)
Dept: HOSPITAL 83 - LAB | Age: 71
Discharge: HOME | End: 2021-04-02
Attending: INTERNAL MEDICINE
Payer: COMMERCIAL

## 2021-04-02 DIAGNOSIS — E03.9: Primary | ICD-10-CM

## 2021-04-02 DIAGNOSIS — D64.9: ICD-10-CM

## 2021-04-02 LAB
HCT VFR BLD AUTO: 41.1 % (ref 37–47)
MCH RBC QN AUTO: 24.5 PG (ref 27–31)
MCHC RBC AUTO-ENTMCNC: 30.2 G/DL (ref 33–37)
MCV RBC AUTO: 81.1 FL (ref 81–99)
NRBC BLD QL AUTO: 0 % (ref 0–0)
OVALOCYTES BLD QL SMEAR: (no result)
PLATELET # BLD AUTO: 391 10*3/UL (ref 130–400)
PLATELET SUFFICIENCY: NORMAL
PMV BLD AUTO: 10.4 FL (ref 9.6–12.3)
RBC # BLD AUTO: 5.07 10*6/UL (ref 4.1–5.1)
TOTAL CELLS COUNTED: 100 #CELLS
WBC NRBC COR # BLD AUTO: 9.4 10*3/UL (ref 4.8–10.8)

## 2021-04-22 ENCOUNTER — HOSPITAL ENCOUNTER (OUTPATIENT)
Dept: HOSPITAL 83 - LAB | Age: 71
Discharge: HOME | End: 2021-04-22
Attending: INTERNAL MEDICINE
Payer: COMMERCIAL

## 2021-04-22 DIAGNOSIS — D64.9: ICD-10-CM

## 2021-04-22 DIAGNOSIS — M54.9: Primary | ICD-10-CM

## 2021-04-22 LAB
BASOPHILS # BLD AUTO: 0.1 10*3/UL (ref 0–0.1)
BASOPHILS NFR BLD AUTO: 0.9 % (ref 0–1)
EOSINOPHIL # BLD AUTO: 0.2 10*3/UL (ref 0–0.4)
EOSINOPHIL # BLD AUTO: 2.3 % (ref 1–4)
ERYTHROCYTE [DISTWIDTH] IN BLOOD BY AUTOMATED COUNT: 28.2 % (ref 0–14.5)
HCT VFR BLD AUTO: 42.8 % (ref 37–47)
LYMPHOCYTES # BLD AUTO: 2.2 10*3/UL (ref 1.3–4.4)
LYMPHOCYTES NFR BLD AUTO: 21.5 % (ref 27–41)
MCH RBC QN AUTO: 25.3 PG (ref 27–31)
MCHC RBC AUTO-ENTMCNC: 29.7 G/DL (ref 33–37)
MCV RBC AUTO: 85.4 FL (ref 81–99)
MONOCYTES # BLD AUTO: 0.5 10*3/UL (ref 0.1–1)
MONOCYTES NFR BLD MANUAL: 5.3 % (ref 3–9)
NEUT #: 7 10*3/UL (ref 2.3–7.9)
NEUT %: 69.5 % (ref 47–73)
NRBC BLD QL AUTO: 0 10*3/UL (ref 0–0)
PLATELET # BLD AUTO: 445 10*3/UL (ref 130–400)
PMV BLD AUTO: 10 FL (ref 9.6–12.3)
RBC # BLD AUTO: 5.01 10*6/UL (ref 4.1–5.1)
WBC NRBC COR # BLD AUTO: 10.1 10*3/UL (ref 4.8–10.8)

## 2021-07-15 ENCOUNTER — HOSPITAL ENCOUNTER (INPATIENT)
Dept: HOSPITAL 83 - ED | Age: 71
LOS: 2 days | Discharge: HOME | DRG: 202 | End: 2021-07-17
Attending: INTERNAL MEDICINE | Admitting: INTERNAL MEDICINE
Payer: COMMERCIAL

## 2021-07-15 VITALS — BODY MASS INDEX: 18.35 KG/M2 | WEIGHT: 91 LBS | HEIGHT: 58.98 IN

## 2021-07-15 VITALS — DIASTOLIC BLOOD PRESSURE: 62 MMHG | SYSTOLIC BLOOD PRESSURE: 133 MMHG

## 2021-07-15 VITALS — SYSTOLIC BLOOD PRESSURE: 140 MMHG | DIASTOLIC BLOOD PRESSURE: 74 MMHG

## 2021-07-15 DIAGNOSIS — Z82.49: ICD-10-CM

## 2021-07-15 DIAGNOSIS — Z83.3: ICD-10-CM

## 2021-07-15 DIAGNOSIS — J44.1: ICD-10-CM

## 2021-07-15 DIAGNOSIS — D50.9: ICD-10-CM

## 2021-07-15 DIAGNOSIS — J44.0: ICD-10-CM

## 2021-07-15 DIAGNOSIS — Z80.8: ICD-10-CM

## 2021-07-15 DIAGNOSIS — Z71.6: ICD-10-CM

## 2021-07-15 DIAGNOSIS — M06.9: ICD-10-CM

## 2021-07-15 DIAGNOSIS — Z90.710: ICD-10-CM

## 2021-07-15 DIAGNOSIS — M15.9: ICD-10-CM

## 2021-07-15 DIAGNOSIS — Z90.49: ICD-10-CM

## 2021-07-15 DIAGNOSIS — Z88.0: ICD-10-CM

## 2021-07-15 DIAGNOSIS — G89.4: ICD-10-CM

## 2021-07-15 DIAGNOSIS — E78.2: ICD-10-CM

## 2021-07-15 DIAGNOSIS — I73.9: ICD-10-CM

## 2021-07-15 DIAGNOSIS — F17.210: ICD-10-CM

## 2021-07-15 DIAGNOSIS — Z98.891: ICD-10-CM

## 2021-07-15 DIAGNOSIS — Z86.73: ICD-10-CM

## 2021-07-15 DIAGNOSIS — J20.9: Primary | ICD-10-CM

## 2021-07-15 DIAGNOSIS — E89.0: ICD-10-CM

## 2021-07-15 DIAGNOSIS — I25.10: ICD-10-CM

## 2021-07-15 LAB
ALBUMIN SERPL-MCNC: 3.8 GM/DL (ref 3.1–4.5)
ALP SERPL-CCNC: 87 U/L (ref 45–117)
ALT SERPL W P-5'-P-CCNC: 14 U/L (ref 12–78)
AST SERPL-CCNC: 12 IU/L (ref 3–35)
BASOPHILS # BLD AUTO: 0 10*3/UL (ref 0–0.1)
BASOPHILS NFR BLD AUTO: 0.2 % (ref 0–1)
BUN SERPL-MCNC: 22 MG/DL (ref 7–24)
CHLORIDE SERPL-SCNC: 108 MMOL/L (ref 98–107)
CREAT SERPL-MCNC: 0.95 MG/DL (ref 0.55–1.02)
EOSINOPHIL # BLD AUTO: 0 % (ref 1–4)
EOSINOPHIL # BLD AUTO: 0 10*3/UL (ref 0–0.4)
ERYTHROCYTE [DISTWIDTH] IN BLOOD BY AUTOMATED COUNT: 16.4 % (ref 0–14.5)
HCT VFR BLD AUTO: 30.9 % (ref 37–47)
LYMPHOCYTES # BLD AUTO: 1.3 10*3/UL (ref 1.3–4.4)
LYMPHOCYTES NFR BLD AUTO: 7.2 % (ref 27–41)
MCH RBC QN AUTO: 24.2 PG (ref 27–31)
MCHC RBC AUTO-ENTMCNC: 30.1 G/DL (ref 33–37)
MCV RBC AUTO: 80.5 FL (ref 81–99)
MONOCYTES # BLD AUTO: 0.7 10*3/UL (ref 0.1–1)
MONOCYTES NFR BLD MANUAL: 3.5 % (ref 3–9)
NEUT #: 16.3 10*3/UL (ref 2.3–7.9)
NEUT %: 88.3 % (ref 47–73)
NRBC BLD QL AUTO: 0 % (ref 0–0)
PLATELET # BLD AUTO: 658 10*3/UL (ref 130–400)
PMV BLD AUTO: 9.1 FL (ref 9.6–12.3)
POTASSIUM SERPL-SCNC: 4 MMOL/L (ref 3.5–5.1)
PROT SERPL-MCNC: 7.5 GM/DL (ref 6.4–8.2)
RBC # BLD AUTO: 3.84 10*6/UL (ref 4.1–5.1)
SODIUM SERPL-SCNC: 137 MMOL/L (ref 136–145)
WBC NRBC COR # BLD AUTO: 18.4 10*3/UL (ref 4.8–10.8)

## 2021-07-16 VITALS — DIASTOLIC BLOOD PRESSURE: 56 MMHG

## 2021-07-16 VITALS — DIASTOLIC BLOOD PRESSURE: 53 MMHG

## 2021-07-16 VITALS — SYSTOLIC BLOOD PRESSURE: 108 MMHG | DIASTOLIC BLOOD PRESSURE: 72 MMHG

## 2021-07-16 VITALS — DIASTOLIC BLOOD PRESSURE: 64 MMHG | SYSTOLIC BLOOD PRESSURE: 132 MMHG

## 2021-07-16 VITALS — DIASTOLIC BLOOD PRESSURE: 56 MMHG | SYSTOLIC BLOOD PRESSURE: 111 MMHG

## 2021-07-16 VITALS — DIASTOLIC BLOOD PRESSURE: 48 MMHG

## 2021-07-16 PROCEDURE — 0BC18ZZ EXTIRPATION OF MATTER FROM TRACHEA, VIA NATURAL OR ARTIFICIAL OPENING ENDOSCOPIC: ICD-10-PCS | Performed by: INTERNAL MEDICINE

## 2021-07-16 PROCEDURE — 0BC78ZZ EXTIRPATION OF MATTER FROM LEFT MAIN BRONCHUS, VIA NATURAL OR ARTIFICIAL OPENING ENDOSCOPIC: ICD-10-PCS | Performed by: INTERNAL MEDICINE

## 2021-07-16 PROCEDURE — 0BC38ZZ EXTIRPATION OF MATTER FROM RIGHT MAIN BRONCHUS, VIA NATURAL OR ARTIFICIAL OPENING ENDOSCOPIC: ICD-10-PCS | Performed by: INTERNAL MEDICINE

## 2021-07-16 PROCEDURE — 0BCB8ZZ EXTIRPATION OF MATTER FROM LEFT LOWER LOBE BRONCHUS, VIA NATURAL OR ARTIFICIAL OPENING ENDOSCOPIC: ICD-10-PCS | Performed by: INTERNAL MEDICINE

## 2021-07-16 PROCEDURE — 0BC58ZZ EXTIRPATION OF MATTER FROM RIGHT MIDDLE LOBE BRONCHUS, VIA NATURAL OR ARTIFICIAL OPENING ENDOSCOPIC: ICD-10-PCS | Performed by: INTERNAL MEDICINE

## 2021-07-16 PROCEDURE — 0BC48ZZ EXTIRPATION OF MATTER FROM RIGHT UPPER LOBE BRONCHUS, VIA NATURAL OR ARTIFICIAL OPENING ENDOSCOPIC: ICD-10-PCS | Performed by: INTERNAL MEDICINE

## 2021-07-16 PROCEDURE — 0BC98ZZ EXTIRPATION OF MATTER FROM LINGULA BRONCHUS, VIA NATURAL OR ARTIFICIAL OPENING ENDOSCOPIC: ICD-10-PCS | Performed by: INTERNAL MEDICINE

## 2021-07-16 PROCEDURE — 0BC88ZZ EXTIRPATION OF MATTER FROM LEFT UPPER LOBE BRONCHUS, VIA NATURAL OR ARTIFICIAL OPENING ENDOSCOPIC: ICD-10-PCS | Performed by: INTERNAL MEDICINE

## 2021-07-16 PROCEDURE — 0BC68ZZ EXTIRPATION OF MATTER FROM RIGHT LOWER LOBE BRONCHUS, VIA NATURAL OR ARTIFICIAL OPENING ENDOSCOPIC: ICD-10-PCS | Performed by: INTERNAL MEDICINE

## 2021-07-17 VITALS — DIASTOLIC BLOOD PRESSURE: 49 MMHG

## 2021-07-17 VITALS — DIASTOLIC BLOOD PRESSURE: 51 MMHG | SYSTOLIC BLOOD PRESSURE: 103 MMHG

## 2021-07-17 VITALS — DIASTOLIC BLOOD PRESSURE: 50 MMHG

## 2021-07-17 VITALS — DIASTOLIC BLOOD PRESSURE: 44 MMHG

## 2021-07-17 LAB — SPECIMEN PREPARATION: (no result)

## 2021-09-13 ENCOUNTER — HOSPITAL ENCOUNTER (OUTPATIENT)
Dept: HOSPITAL 83 - LAB | Age: 71
Discharge: HOME | End: 2021-09-13
Attending: INTERNAL MEDICINE
Payer: COMMERCIAL

## 2021-09-13 DIAGNOSIS — K55.21: Primary | ICD-10-CM

## 2021-09-13 LAB
BASOPHILS # BLD AUTO: 0.1 10*3/UL (ref 0–0.1)
BASOPHILS NFR BLD AUTO: 0.7 % (ref 0–1)
EOSINOPHIL # BLD AUTO: 0.2 10*3/UL (ref 0–0.4)
EOSINOPHIL # BLD AUTO: 1.7 % (ref 1–4)
ERYTHROCYTE [DISTWIDTH] IN BLOOD BY AUTOMATED COUNT: 23.5 % (ref 0–14.5)
HCT VFR BLD AUTO: 39.3 % (ref 37–47)
LYMPHOCYTES # BLD AUTO: 3.5 10*3/UL (ref 1.3–4.4)
LYMPHOCYTES NFR BLD AUTO: 26 % (ref 27–41)
MCH RBC QN AUTO: 27.6 PG (ref 27–31)
MCHC RBC AUTO-ENTMCNC: 30.5 G/DL (ref 33–37)
MCV RBC AUTO: 90.6 FL (ref 81–99)
MONOCYTES # BLD AUTO: 0.7 10*3/UL (ref 0.1–1)
MONOCYTES NFR BLD MANUAL: 5.2 % (ref 3–9)
NEUT #: 8.7 10*3/UL (ref 2.3–7.9)
NEUT %: 65.4 % (ref 47–73)
NRBC BLD QL AUTO: 0 10*3/UL (ref 0–0)
PLATELET # BLD AUTO: 477 10*3/UL (ref 130–400)
PMV BLD AUTO: 10.3 FL (ref 9.6–12.3)
RBC # BLD AUTO: 4.34 10*6/UL (ref 4.1–5.1)
WBC NRBC COR # BLD AUTO: 13.3 10*3/UL (ref 4.8–10.8)

## 2022-01-05 ENCOUNTER — HOSPITAL ENCOUNTER (OUTPATIENT)
Dept: HOSPITAL 83 - COVID19 | Age: 72
Discharge: HOME | End: 2022-01-05
Payer: COMMERCIAL

## 2022-01-05 DIAGNOSIS — Z20.822: Primary | ICD-10-CM

## 2022-02-02 NOTE — NUR
Pt called inquiring how long to hold calcium supplement for. Pt reported imaging deparment told her to hold calcium until after her dexa scan appointment. Per PCP hold until after dexa scan appt. Pt notified.    SLEPT THROUGHOUT NIGHT WITH NO DISTRESS NOTED. RESPIRATIONS EASY. REMAINS NPO
FOR BRONCH. CALL LIGHT WITHIN REACH. NO VOICED COMPLAINTS THIS SHIFT

## 2022-05-03 ENCOUNTER — HOSPITAL ENCOUNTER (OUTPATIENT)
Dept: HOSPITAL 83 - RAD | Age: 72
Discharge: HOME | End: 2022-05-03
Attending: INTERNAL MEDICINE
Payer: COMMERCIAL

## 2022-05-03 DIAGNOSIS — M81.0: Primary | ICD-10-CM

## 2022-06-15 ENCOUNTER — HOSPITAL ENCOUNTER (OUTPATIENT)
Dept: HOSPITAL 83 - MRI | Age: 72
Discharge: HOME | End: 2022-06-15
Attending: INTERNAL MEDICINE
Payer: COMMERCIAL

## 2022-06-15 DIAGNOSIS — R42: ICD-10-CM

## 2022-06-15 DIAGNOSIS — R51.9: Primary | ICD-10-CM

## 2022-06-15 DIAGNOSIS — R47.81: ICD-10-CM

## 2022-09-19 ENCOUNTER — HOSPITAL ENCOUNTER (OUTPATIENT)
Dept: HOSPITAL 83 - LAB | Age: 72
Discharge: HOME | End: 2022-09-19
Attending: INTERNAL MEDICINE
Payer: COMMERCIAL

## 2022-09-19 DIAGNOSIS — I10: Primary | ICD-10-CM

## 2022-09-19 DIAGNOSIS — E03.9: ICD-10-CM

## 2022-09-19 DIAGNOSIS — Z13.29: ICD-10-CM

## 2022-09-19 DIAGNOSIS — Z13.89: ICD-10-CM

## 2022-09-19 DIAGNOSIS — Z13.228: ICD-10-CM

## 2022-09-19 DIAGNOSIS — Z13.9: ICD-10-CM

## 2022-09-19 DIAGNOSIS — Z13.6: ICD-10-CM

## 2022-09-19 DIAGNOSIS — R70.0: ICD-10-CM

## 2022-09-19 DIAGNOSIS — E55.9: ICD-10-CM

## 2022-09-19 DIAGNOSIS — Z13.0: ICD-10-CM

## 2022-09-19 DIAGNOSIS — Z13.1: ICD-10-CM

## 2022-09-19 DIAGNOSIS — Z13.21: ICD-10-CM

## 2022-09-19 DIAGNOSIS — Z13.220: ICD-10-CM

## 2022-09-19 LAB
25(OH)D3 SERPL-MCNC: 10.5 NG/ML (ref 30–100)
ALP SERPL-CCNC: 88 U/L (ref 45–117)
ALT SERPL W P-5'-P-CCNC: 14 U/L (ref 12–78)
AST SERPL-CCNC: 18 IU/L (ref 3–35)
BASOPHILS # BLD AUTO: 0.1 10*3/UL (ref 0–0.1)
BASOPHILS NFR BLD AUTO: 0.9 % (ref 0–1)
BUN SERPL-MCNC: 20 MG/DL (ref 7–24)
CHLORIDE SERPL-SCNC: 108 MMOL/L (ref 98–107)
CHOLEST SERPL-MCNC: 236 MG/DL (ref ?–200)
CREAT SERPL-MCNC: 0.78 MG/DL (ref 0.55–1.02)
EOSINOPHIL # BLD AUTO: 0.2 10*3/UL (ref 0–0.4)
EOSINOPHIL # BLD AUTO: 2.3 % (ref 1–4)
ERYTHROCYTE [DISTWIDTH] IN BLOOD BY AUTOMATED COUNT: 18.6 % (ref 0–14.5)
HCT VFR BLD AUTO: 31.5 % (ref 37–47)
LYMPHOCYTES # BLD AUTO: 1.8 10*3/UL (ref 1.3–4.4)
LYMPHOCYTES NFR BLD AUTO: 17.1 % (ref 27–41)
MCH RBC QN AUTO: 21.1 PG (ref 27–31)
MCHC RBC AUTO-ENTMCNC: 28.9 G/DL (ref 33–37)
MCV RBC AUTO: 73.1 FL (ref 81–99)
MONOCYTES # BLD AUTO: 0.5 10*3/UL (ref 0.1–1)
MONOCYTES NFR BLD MANUAL: 4.9 % (ref 3–9)
NEUT #: 7.9 10*3/UL (ref 2.3–7.9)
NEUT %: 74.4 % (ref 47–73)
NRBC BLD QL AUTO: 0 % (ref 0–0)
PLATELET # BLD AUTO: 495 10*3/UL (ref 130–400)
PMV BLD AUTO: 9.3 FL (ref 9.6–12.3)
POTASSIUM SERPL-SCNC: 4.6 MMOL/L (ref 3.5–5.1)
PROT SERPL-MCNC: 7.3 GM/DL (ref 6.4–8.2)
RBC # BLD AUTO: 4.31 10*6/UL (ref 4.1–5.1)
SODIUM SERPL-SCNC: 138 MMOL/L (ref 136–145)
T4 FREE SERPL-MCNC: 1.57 NG/DL (ref 0.76–1.46)
TRIGL SERPL-MCNC: 1147 MG/DL (ref ?–150)
TSH SERPL DL<=0.005 MIU/L-ACNC: 1.82 UIU/ML (ref 0.36–4.75)
VITAMIN B12: 305 PG/ML (ref 247–911)
WBC NRBC COR # BLD AUTO: 10.6 10*3/UL (ref 4.8–10.8)

## 2022-11-17 ENCOUNTER — HOSPITAL ENCOUNTER (OUTPATIENT)
Dept: HOSPITAL 83 - LAB | Age: 72
Discharge: HOME | End: 2022-11-17
Attending: INTERNAL MEDICINE
Payer: COMMERCIAL

## 2022-11-17 DIAGNOSIS — Z13.228: ICD-10-CM

## 2022-11-17 DIAGNOSIS — D52.9: ICD-10-CM

## 2022-11-17 DIAGNOSIS — Z13.1: ICD-10-CM

## 2022-11-17 DIAGNOSIS — Z13.89: ICD-10-CM

## 2022-11-17 DIAGNOSIS — Z13.21: ICD-10-CM

## 2022-11-17 DIAGNOSIS — Z13.220: Primary | ICD-10-CM

## 2022-11-17 DIAGNOSIS — Z13.0: ICD-10-CM

## 2022-11-17 DIAGNOSIS — Z13.6: ICD-10-CM

## 2022-11-17 DIAGNOSIS — E55.9: ICD-10-CM

## 2022-11-17 DIAGNOSIS — E03.9: ICD-10-CM

## 2022-11-17 DIAGNOSIS — D51.9: ICD-10-CM

## 2022-11-17 DIAGNOSIS — R53.81: ICD-10-CM

## 2022-11-17 DIAGNOSIS — R79.89: ICD-10-CM

## 2022-11-17 LAB
BASOPHILS # BLD AUTO: 0.1 10*3/UL (ref 0–0.1)
BASOPHILS NFR BLD AUTO: 0.6 % (ref 0–1)
EOSINOPHIL # BLD AUTO: 0.2 10*3/UL (ref 0–0.4)
EOSINOPHIL # BLD AUTO: 1.4 % (ref 1–4)
ERYTHROCYTE [DISTWIDTH] IN BLOOD BY AUTOMATED COUNT: 19.9 % (ref 0–14.5)
HCT VFR BLD AUTO: 32.2 % (ref 37–47)
LYMPHOCYTES # BLD AUTO: 1.8 10*3/UL (ref 1.3–4.4)
LYMPHOCYTES NFR BLD AUTO: 16 % (ref 27–41)
MCH RBC QN AUTO: 20.1 PG (ref 27–31)
MCHC RBC AUTO-ENTMCNC: 28.6 G/DL (ref 33–37)
MCV RBC AUTO: 70.3 FL (ref 81–99)
MONOCYTES # BLD AUTO: 0.6 10*3/UL (ref 0.1–1)
MONOCYTES NFR BLD MANUAL: 5 % (ref 3–9)
NEUT #: 8.5 10*3/UL (ref 2.3–7.9)
NEUT %: 76.6 % (ref 47–73)
NRBC BLD QL AUTO: 0 % (ref 0–0)
PLATELET # BLD AUTO: 550 10*3/UL (ref 130–400)
PMV BLD AUTO: 8.8 FL (ref 9.6–12.3)
RBC # BLD AUTO: 4.58 10*6/UL (ref 4.1–5.1)
WBC NRBC COR # BLD AUTO: 11.1 10*3/UL (ref 4.8–10.8)

## 2022-11-28 ENCOUNTER — HOSPITAL ENCOUNTER (OUTPATIENT)
Dept: HOSPITAL 83 - LAB | Age: 72
Discharge: HOME | End: 2022-11-28
Attending: HOSPITALIST
Payer: COMMERCIAL

## 2022-11-28 DIAGNOSIS — D50.9: Primary | ICD-10-CM

## 2022-11-28 LAB
ALP SERPL-CCNC: 96 U/L (ref 45–117)
ALT SERPL W P-5'-P-CCNC: 17 U/L (ref 12–78)
BASOPHILS # BLD AUTO: 0.1 10*3/UL (ref 0–0.1)
BASOPHILS NFR BLD AUTO: 0.8 % (ref 0–1)
BUN SERPL-MCNC: 20 MG/DL (ref 7–24)
CHLORIDE SERPL-SCNC: 105 MMOL/L (ref 98–107)
CREAT SERPL-MCNC: 0.76 MG/DL (ref 0.55–1.02)
EOSINOPHIL # BLD AUTO: 0.1 10*3/UL (ref 0–0.4)
EOSINOPHIL # BLD AUTO: 0.8 % (ref 1–4)
ERYTHROCYTE [DISTWIDTH] IN BLOOD BY AUTOMATED COUNT: 19.3 % (ref 0–14.5)
HCT VFR BLD AUTO: 33.3 % (ref 37–47)
LYMPHOCYTES # BLD AUTO: 2.1 10*3/UL (ref 1.3–4.4)
LYMPHOCYTES NFR BLD AUTO: 15.8 % (ref 27–41)
MCH RBC QN AUTO: 20.1 PG (ref 27–31)
MCHC RBC AUTO-ENTMCNC: 28.5 G/DL (ref 33–37)
MCV RBC AUTO: 70.6 FL (ref 81–99)
MONOCYTES # BLD AUTO: 0.5 10*3/UL (ref 0.1–1)
MONOCYTES NFR BLD MANUAL: 3.5 % (ref 3–9)
NEUT #: 10.5 10*3/UL (ref 2.3–7.9)
NEUT %: 78.4 % (ref 47–73)
NRBC BLD QL AUTO: 0 % (ref 0–0)
PLATELET # BLD AUTO: 509 10*3/UL (ref 130–400)
PMV BLD AUTO: 8.8 FL (ref 9.6–12.3)
POTASSIUM SERPL-SCNC: 4.1 MMOL/L (ref 3.5–5.1)
PROT SERPL-MCNC: 8.2 GM/DL (ref 6.4–8.2)
RBC # BLD AUTO: 4.72 10*6/UL (ref 4.1–5.1)
SODIUM SERPL-SCNC: 133 MMOL/L (ref 136–145)
VITAMIN B12: 255 PG/ML (ref 247–911)
WBC NRBC COR # BLD AUTO: 13.4 10*3/UL (ref 4.8–10.8)

## 2022-11-30 LAB — METHYLMALONATE SERPL-SCNC: 134 NMOL/L (ref 0–378)

## 2023-02-08 ENCOUNTER — HOSPITAL ENCOUNTER (OUTPATIENT)
Dept: HOSPITAL 83 - LAB | Age: 73
Discharge: HOME | End: 2023-02-08
Attending: INTERNAL MEDICINE
Payer: COMMERCIAL

## 2023-02-08 DIAGNOSIS — D50.0: Primary | ICD-10-CM

## 2023-03-01 ENCOUNTER — HOSPITAL ENCOUNTER (OUTPATIENT)
Dept: HOSPITAL 83 - SDC | Age: 73
Discharge: HOME | End: 2023-03-01
Attending: OPHTHALMOLOGY
Payer: COMMERCIAL

## 2023-03-01 VITALS — DIASTOLIC BLOOD PRESSURE: 62 MMHG

## 2023-03-01 VITALS — BODY MASS INDEX: 18.95 KG/M2 | HEIGHT: 58.98 IN | WEIGHT: 94 LBS

## 2023-03-01 VITALS — SYSTOLIC BLOOD PRESSURE: 116 MMHG | DIASTOLIC BLOOD PRESSURE: 65 MMHG

## 2023-03-01 VITALS — DIASTOLIC BLOOD PRESSURE: 63 MMHG

## 2023-03-01 VITALS — SYSTOLIC BLOOD PRESSURE: 110 MMHG | DIASTOLIC BLOOD PRESSURE: 68 MMHG

## 2023-03-01 DIAGNOSIS — Z79.899: ICD-10-CM

## 2023-03-01 DIAGNOSIS — Z88.0: ICD-10-CM

## 2023-03-01 DIAGNOSIS — I25.10: ICD-10-CM

## 2023-03-01 DIAGNOSIS — F17.210: ICD-10-CM

## 2023-03-01 DIAGNOSIS — H25.812: Primary | ICD-10-CM

## 2023-03-01 DIAGNOSIS — J43.9: ICD-10-CM

## 2023-03-01 DIAGNOSIS — Z86.73: ICD-10-CM

## 2023-03-01 DIAGNOSIS — E78.00: ICD-10-CM

## 2023-04-11 ENCOUNTER — HOSPITAL ENCOUNTER (OUTPATIENT)
Dept: HOSPITAL 83 - LAB | Age: 73
Discharge: HOME | End: 2023-04-11
Attending: INTERNAL MEDICINE
Payer: COMMERCIAL

## 2023-04-11 DIAGNOSIS — D64.9: Primary | ICD-10-CM

## 2023-04-11 DIAGNOSIS — E55.9: ICD-10-CM

## 2023-04-11 DIAGNOSIS — R79.89: ICD-10-CM

## 2023-04-11 DIAGNOSIS — R53.1: ICD-10-CM

## 2023-04-11 LAB
BASOPHILS # BLD AUTO: 0.1 10*3/UL (ref 0–0.1)
BASOPHILS NFR BLD AUTO: 0.7 % (ref 0–1)
EOSINOPHIL # BLD AUTO: 0.2 10*3/UL (ref 0–0.4)
EOSINOPHIL # BLD AUTO: 2.4 % (ref 1–4)
ERYTHROCYTE [DISTWIDTH] IN BLOOD BY AUTOMATED COUNT: 16.1 % (ref 0–14.5)
HCT VFR BLD AUTO: 39.4 % (ref 37–47)
LYMPHOCYTES # BLD AUTO: 2.4 10*3/UL (ref 1.3–4.4)
LYMPHOCYTES NFR BLD AUTO: 28.8 % (ref 27–41)
MCH RBC QN AUTO: 31.3 PG (ref 27–31)
MCHC RBC AUTO-ENTMCNC: 32.7 G/DL (ref 33–37)
MCV RBC AUTO: 95.6 FL (ref 81–99)
MONOCYTES # BLD AUTO: 0.4 10*3/UL (ref 0.1–1)
MONOCYTES NFR BLD MANUAL: 5.2 % (ref 3–9)
NEUT #: 5.2 10*3/UL (ref 2.3–7.9)
NEUT %: 62.4 % (ref 47–73)
NRBC BLD QL AUTO: 0 % (ref 0–0)
PLATELET # BLD AUTO: 343 10*3/UL (ref 130–400)
PMV BLD AUTO: 10.1 FL (ref 9.6–12.3)
RBC # BLD AUTO: 4.12 10*6/UL (ref 4.1–5.1)
WBC NRBC COR # BLD AUTO: 8.3 10*3/UL (ref 4.8–10.8)

## 2023-05-03 ENCOUNTER — HOSPITAL ENCOUNTER (OUTPATIENT)
Dept: HOSPITAL 83 - SDC | Age: 73
Discharge: HOME | End: 2023-05-03
Attending: OPHTHALMOLOGY
Payer: COMMERCIAL

## 2023-05-03 VITALS — DIASTOLIC BLOOD PRESSURE: 60 MMHG

## 2023-05-03 VITALS — DIASTOLIC BLOOD PRESSURE: 69 MMHG | SYSTOLIC BLOOD PRESSURE: 147 MMHG

## 2023-05-03 VITALS — DIASTOLIC BLOOD PRESSURE: 78 MMHG | SYSTOLIC BLOOD PRESSURE: 157 MMHG

## 2023-05-03 VITALS — BODY MASS INDEX: 18.95 KG/M2 | WEIGHT: 94 LBS | HEIGHT: 58.98 IN

## 2023-05-03 VITALS — DIASTOLIC BLOOD PRESSURE: 49 MMHG

## 2023-05-03 DIAGNOSIS — H25.811: Primary | ICD-10-CM

## 2023-05-03 DIAGNOSIS — Z86.73: ICD-10-CM

## 2023-05-03 DIAGNOSIS — Z98.1: ICD-10-CM

## 2023-05-03 DIAGNOSIS — Z79.899: ICD-10-CM

## 2023-05-03 DIAGNOSIS — F17.210: ICD-10-CM

## 2023-05-03 DIAGNOSIS — I25.10: ICD-10-CM

## 2023-05-03 DIAGNOSIS — E78.00: ICD-10-CM

## 2023-05-03 DIAGNOSIS — J43.9: ICD-10-CM

## 2023-06-27 ENCOUNTER — HOSPITAL ENCOUNTER (OUTPATIENT)
Dept: HOSPITAL 83 - LAB | Age: 73
End: 2023-06-27
Attending: INTERNAL MEDICINE
Payer: MEDICARE

## 2023-06-27 DIAGNOSIS — Z13.220: ICD-10-CM

## 2023-06-27 DIAGNOSIS — I10: ICD-10-CM

## 2023-06-27 DIAGNOSIS — Z13.89: ICD-10-CM

## 2023-06-27 DIAGNOSIS — Z13.29: ICD-10-CM

## 2023-06-27 DIAGNOSIS — Z13.228: ICD-10-CM

## 2023-06-27 DIAGNOSIS — E78.2: ICD-10-CM

## 2023-06-27 DIAGNOSIS — Z12.31: ICD-10-CM

## 2023-06-27 DIAGNOSIS — Z13.9: ICD-10-CM

## 2023-06-27 DIAGNOSIS — E55.9: ICD-10-CM

## 2023-06-27 DIAGNOSIS — Z13.1: ICD-10-CM

## 2023-06-27 DIAGNOSIS — Z13.6: ICD-10-CM

## 2023-06-27 DIAGNOSIS — F41.1: ICD-10-CM

## 2023-06-27 DIAGNOSIS — Z13.0: Primary | ICD-10-CM

## 2023-06-27 DIAGNOSIS — F33.1: ICD-10-CM

## 2023-06-27 LAB
25(OH)D3 SERPL-MCNC: 26.4 NG/ML (ref 30–100)
ALP SERPL-CCNC: 67 U/L (ref 46–116)
ALT SERPL W P-5'-P-CCNC: 10 U/L (ref 10–49)
BASOPHILS # BLD AUTO: 0.1 10*3/UL (ref 0–0.1)
BASOPHILS NFR BLD AUTO: 0.7 % (ref 0–1)
BUN SERPL-MCNC: 20 MG/DL (ref 9–23)
CHLORIDE SERPL-SCNC: 105 MMOL/L (ref 98–107)
CHOLEST SERPL-MCNC: 195 MG/DL (ref ?–200)
EOSINOPHIL # BLD AUTO: 0.1 10*3/UL (ref 0–0.4)
EOSINOPHIL # BLD AUTO: 1.2 % (ref 1–4)
ERYTHROCYTE [DISTWIDTH] IN BLOOD BY AUTOMATED COUNT: 13.6 % (ref 0–14.5)
HCT VFR BLD AUTO: 40.2 % (ref 37–47)
LYMPHOCYTES # BLD AUTO: 1.9 10*3/UL (ref 1.3–4.4)
LYMPHOCYTES NFR BLD AUTO: 17.3 % (ref 27–41)
MCH RBC QN AUTO: 32.2 PG (ref 27–31)
MCHC RBC AUTO-ENTMCNC: 33.1 G/DL (ref 33–37)
MCV RBC AUTO: 97.3 FL (ref 81–99)
MONOCYTES # BLD AUTO: 0.5 10*3/UL (ref 0.1–1)
MONOCYTES NFR BLD MANUAL: 4.9 % (ref 3–9)
NEUT #: 8.2 10*3/UL (ref 2.3–7.9)
NEUT %: 75.3 % (ref 47–73)
NRBC BLD QL AUTO: 0 % (ref 0–0)
PLATELET # BLD AUTO: 404 10*3/UL (ref 130–400)
PMV BLD AUTO: 10.9 FL (ref 9.6–12.3)
POTASSIUM SERPL-SCNC: 4.4 MMOL/L (ref 3.4–5.1)
PROT SERPL-MCNC: 6.7 GM/DL (ref 6–8)
RBC # BLD AUTO: 4.13 10*6/UL (ref 4.1–5.1)
T4 FREE SERPL-MCNC: 1.96 NG/DL (ref 0.89–1.76)
TRIGL SERPL-MCNC: 538 MG/DL (ref ?–150)
TSH SERPL DL<=0.005 MIU/L-ACNC: 0.02 UIU/ML (ref 0.55–4.78)
VITAMIN B12: 384 PG/ML (ref 211–911)
WBC NRBC COR # BLD AUTO: 10.9 10*3/UL (ref 4.8–10.8)

## 2023-10-11 ENCOUNTER — HOSPITAL ENCOUNTER (OUTPATIENT)
Dept: HOSPITAL 83 - LAB | Age: 73
Discharge: HOME | End: 2023-10-11
Attending: INTERNAL MEDICINE
Payer: MEDICARE

## 2023-10-11 DIAGNOSIS — D51.9: ICD-10-CM

## 2023-10-11 DIAGNOSIS — Z13.21: ICD-10-CM

## 2023-10-11 DIAGNOSIS — E55.9: ICD-10-CM

## 2023-10-11 DIAGNOSIS — Z13.0: Primary | ICD-10-CM

## 2023-10-11 DIAGNOSIS — Z13.89: ICD-10-CM

## 2023-10-11 DIAGNOSIS — D52.9: ICD-10-CM

## 2023-10-11 DIAGNOSIS — E03.9: ICD-10-CM

## 2023-10-11 DIAGNOSIS — Z13.220: ICD-10-CM

## 2023-10-11 DIAGNOSIS — R53.81: ICD-10-CM

## 2023-10-11 DIAGNOSIS — R79.89: ICD-10-CM

## 2023-10-11 DIAGNOSIS — Z13.6: ICD-10-CM

## 2023-10-11 DIAGNOSIS — Z13.228: ICD-10-CM

## 2023-10-17 ENCOUNTER — HOSPITAL ENCOUNTER (OUTPATIENT)
Dept: HOSPITAL 83 - CT | Age: 73
Discharge: HOME | End: 2023-10-17
Attending: INTERNAL MEDICINE
Payer: MEDICARE

## 2023-10-17 DIAGNOSIS — Z95.828: ICD-10-CM

## 2023-10-17 DIAGNOSIS — I73.9: Primary | ICD-10-CM

## 2023-12-11 ENCOUNTER — HOSPITAL ENCOUNTER (EMERGENCY)
Dept: HOSPITAL 83 - ED | Age: 73
Discharge: HOME | End: 2023-12-11
Payer: MEDICARE

## 2023-12-11 VITALS — HEIGHT: 57.99 IN | WEIGHT: 85 LBS | BODY MASS INDEX: 17.84 KG/M2

## 2023-12-11 DIAGNOSIS — K52.9: Primary | ICD-10-CM

## 2023-12-11 DIAGNOSIS — Z90.89: ICD-10-CM

## 2023-12-11 DIAGNOSIS — Z88.0: ICD-10-CM

## 2023-12-11 DIAGNOSIS — J44.9: ICD-10-CM

## 2023-12-11 DIAGNOSIS — Z90.711: ICD-10-CM

## 2023-12-11 DIAGNOSIS — R73.9: ICD-10-CM

## 2023-12-11 DIAGNOSIS — Z79.899: ICD-10-CM

## 2023-12-11 DIAGNOSIS — F17.200: ICD-10-CM

## 2023-12-11 DIAGNOSIS — F32.A: ICD-10-CM

## 2023-12-11 LAB
ALP SERPL-CCNC: 74 U/L (ref 46–116)
ALT SERPL W P-5'-P-CCNC: 12 U/L (ref 5–49)
BACTERIA #/AREA URNS HPF: (no result) /[HPF]
BASOPHILS # BLD AUTO: 0.1 10*3/UL (ref 0–0.1)
BASOPHILS NFR BLD AUTO: 0.4 % (ref 0–1)
BUN SERPL-MCNC: 15 MG/DL (ref 9–23)
CASTS URNS QL MICRO: (no result)
CHLORIDE SERPL-SCNC: 107 MMOL/L (ref 98–107)
EOSINOPHIL # BLD AUTO: 0.2 10*3/UL (ref 0–0.4)
EOSINOPHIL # BLD AUTO: 1.3 % (ref 1–4)
EPI CELLS #/AREA URNS HPF: (no result) /[HPF]
ERYTHROCYTE [DISTWIDTH] IN BLOOD BY AUTOMATED COUNT: 13.9 % (ref 0–14.5)
HCT VFR BLD AUTO: 45.7 % (ref 37–47)
LIPASE SERPL-CCNC: 38 U/L (ref 12–53)
LYMPHOCYTES # BLD AUTO: 1.9 10*3/UL (ref 1.3–4.4)
LYMPHOCYTES NFR BLD AUTO: 13.9 % (ref 27–41)
MCH RBC QN AUTO: 30.8 PG (ref 27–31)
MCHC RBC AUTO-ENTMCNC: 33 G/DL (ref 33–37)
MCV RBC AUTO: 93.1 FL (ref 81–99)
MONOCYTES # BLD AUTO: 0.8 10*3/UL (ref 0.1–1)
MONOCYTES NFR BLD MANUAL: 5.7 % (ref 3–9)
MUCOUS THREADS URNS QL MICRO: (no result)
NEUT #: 10.6 10*3/UL (ref 2.3–7.9)
NEUT %: 78.2 % (ref 47–73)
NRBC BLD QL AUTO: 0 10*3/UL (ref 0–0)
PH UR STRIP: 6 [PH] (ref 4.5–8)
PLATELET # BLD AUTO: 386 10*3/UL (ref 130–400)
PMV BLD AUTO: 10.8 FL (ref 9.6–12.3)
POTASSIUM SERPL-SCNC: 4.2 MMOL/L (ref 3.4–5.1)
PROT SERPL-MCNC: 6.8 GM/DL (ref 6–8)
RBC # BLD AUTO: 4.91 10*6/UL (ref 4.1–5.1)
RBC #/AREA URNS HPF: (no result) RBC/HPF (ref 0–2)
SP GR UR: 1.02 (ref 1–1.03)
UROBILINOGEN UR STRIP-MCNC: 1 E.U./DL (ref 0–1)
WBC #/AREA URNS HPF: (no result) WBC/HPF (ref 0–5)
WBC NRBC COR # BLD AUTO: 13.6 10*3/UL (ref 4.8–10.8)

## 2024-01-04 ENCOUNTER — HOSPITAL ENCOUNTER (OUTPATIENT)
Dept: HOSPITAL 83 - LAB | Age: 74
Discharge: HOME | End: 2024-01-04
Attending: INTERNAL MEDICINE
Payer: MEDICARE

## 2024-01-04 DIAGNOSIS — I73.9: Primary | ICD-10-CM

## 2024-01-04 DIAGNOSIS — Z79.899: ICD-10-CM

## 2024-01-04 LAB
BASOPHILS # BLD AUTO: 0.1 10*3/UL (ref 0–0.1)
BASOPHILS NFR BLD AUTO: 0.8 % (ref 0–1)
BUN SERPL-MCNC: 18 MG/DL (ref 9–23)
CHLORIDE SERPL-SCNC: 107 MMOL/L (ref 98–107)
EOSINOPHIL # BLD AUTO: 0.4 10*3/UL (ref 0–0.4)
EOSINOPHIL # BLD AUTO: 3.4 % (ref 1–4)
ERYTHROCYTE [DISTWIDTH] IN BLOOD BY AUTOMATED COUNT: 14.5 % (ref 0–14.5)
HCT VFR BLD AUTO: 47.5 % (ref 37–47)
LYMPHOCYTES # BLD AUTO: 3 10*3/UL (ref 1.3–4.4)
LYMPHOCYTES NFR BLD AUTO: 23.2 % (ref 27–41)
MCH RBC QN AUTO: 30 PG (ref 27–31)
MCHC RBC AUTO-ENTMCNC: 32 G/DL (ref 33–37)
MCV RBC AUTO: 93.9 FL (ref 81–99)
MONOCYTES # BLD AUTO: 0.7 10*3/UL (ref 0.1–1)
MONOCYTES NFR BLD MANUAL: 5 % (ref 3–9)
NEUT #: 8.7 10*3/UL (ref 2.3–7.9)
NEUT %: 66.4 % (ref 47–73)
NRBC BLD QL AUTO: 0 % (ref 0–0)
PLATELET # BLD AUTO: 451 10*3/UL (ref 130–400)
PMV BLD AUTO: 10.6 FL (ref 9.6–12.3)
POTASSIUM SERPL-SCNC: 5.4 MMOL/L (ref 3.4–5.1)
RBC # BLD AUTO: 5.06 10*6/UL (ref 4.1–5.1)
WBC NRBC COR # BLD AUTO: 13 10*3/UL (ref 4.8–10.8)

## 2024-04-17 ENCOUNTER — HOSPITAL ENCOUNTER (OUTPATIENT)
Dept: HOSPITAL 83 - ORTHO | Age: 74
Discharge: HOME | End: 2024-04-17
Attending: ORTHOPAEDIC SURGERY
Payer: MEDICARE

## 2024-04-17 DIAGNOSIS — M16.12: ICD-10-CM

## 2024-04-17 DIAGNOSIS — M85.852: Primary | ICD-10-CM

## 2024-05-02 ENCOUNTER — HOSPITAL ENCOUNTER (OUTPATIENT)
Dept: HOSPITAL 83 - SDC | Age: 74
Discharge: HOME | End: 2024-05-02
Attending: ORTHOPAEDIC SURGERY
Payer: MEDICARE

## 2024-05-02 VITALS — DIASTOLIC BLOOD PRESSURE: 83 MMHG | SYSTOLIC BLOOD PRESSURE: 152 MMHG

## 2024-05-02 VITALS — DIASTOLIC BLOOD PRESSURE: 69 MMHG

## 2024-05-02 VITALS — DIASTOLIC BLOOD PRESSURE: 62 MMHG

## 2024-05-02 VITALS — DIASTOLIC BLOOD PRESSURE: 86 MMHG

## 2024-05-02 DIAGNOSIS — F17.210: ICD-10-CM

## 2024-05-02 DIAGNOSIS — Z80.9: ICD-10-CM

## 2024-05-02 DIAGNOSIS — M81.0: ICD-10-CM

## 2024-05-02 DIAGNOSIS — E78.1: ICD-10-CM

## 2024-05-02 DIAGNOSIS — M16.12: Primary | ICD-10-CM

## 2024-05-02 DIAGNOSIS — M85.80: ICD-10-CM

## 2024-05-02 DIAGNOSIS — M54.9: ICD-10-CM

## 2024-05-02 DIAGNOSIS — G89.29: ICD-10-CM

## 2024-05-02 DIAGNOSIS — E78.00: ICD-10-CM

## 2024-05-02 DIAGNOSIS — M25.552: ICD-10-CM

## 2024-05-02 DIAGNOSIS — E03.9: ICD-10-CM

## 2024-05-10 ENCOUNTER — HOSPITAL ENCOUNTER (OUTPATIENT)
Dept: HOSPITAL 83 - LAB | Age: 74
End: 2024-05-10
Attending: INTERNAL MEDICINE
Payer: MEDICARE

## 2024-05-10 DIAGNOSIS — Z13.89: ICD-10-CM

## 2024-05-10 DIAGNOSIS — Z13.9: ICD-10-CM

## 2024-05-10 DIAGNOSIS — Z13.0: ICD-10-CM

## 2024-05-10 DIAGNOSIS — I10: ICD-10-CM

## 2024-05-10 DIAGNOSIS — Z13.1: Primary | ICD-10-CM

## 2024-05-10 DIAGNOSIS — Z13.21: ICD-10-CM

## 2024-05-10 DIAGNOSIS — E03.9: ICD-10-CM

## 2024-05-10 DIAGNOSIS — Z13.220: ICD-10-CM

## 2024-05-10 DIAGNOSIS — Z13.29: ICD-10-CM

## 2024-05-10 DIAGNOSIS — E78.2: ICD-10-CM

## 2024-05-10 LAB
25(OH)D3 SERPL-MCNC: 25.9 NG/ML (ref 30–100)
ALP SERPL-CCNC: 82 U/L (ref 46–116)
ALT SERPL W P-5'-P-CCNC: 8 U/L (ref 5–49)
BASOPHILS # BLD AUTO: 0.1 10*3/UL (ref 0–0.1)
BASOPHILS NFR BLD AUTO: 1 % (ref 0–1)
BUN SERPL-MCNC: 13 MG/DL (ref 9–23)
CHLORIDE SERPL-SCNC: 106 MMOL/L (ref 98–107)
CHOLEST SERPL-MCNC: 213 MG/DL (ref ?–200)
EOSINOPHIL # BLD AUTO: 0.4 10*3/UL (ref 0–0.4)
EOSINOPHIL # BLD AUTO: 3.4 % (ref 1–4)
ERYTHROCYTE [DISTWIDTH] IN BLOOD BY AUTOMATED COUNT: 15.9 % (ref 0–14.5)
HCT VFR BLD AUTO: 45 % (ref 37–47)
LYMPHOCYTES # BLD AUTO: 2.3 10*3/UL (ref 1.3–4.4)
LYMPHOCYTES NFR BLD AUTO: 20.4 % (ref 27–41)
MCH RBC QN AUTO: 28.5 PG (ref 27–31)
MCHC RBC AUTO-ENTMCNC: 30.2 G/DL (ref 33–37)
MCV RBC AUTO: 94.3 FL (ref 81–99)
MONOCYTES # BLD AUTO: 0.6 10*3/UL (ref 0.1–1)
MONOCYTES NFR BLD MANUAL: 5.1 % (ref 3–9)
NEUT #: 7.9 10*3/UL (ref 2.3–7.9)
NEUT %: 69.7 % (ref 47–73)
NRBC BLD QL AUTO: 0 % (ref 0–0)
PLATELET # BLD AUTO: 467 10*3/UL (ref 130–400)
PMV BLD AUTO: 10.2 FL (ref 9.6–12.3)
POTASSIUM SERPL-SCNC: 4 MMOL/L (ref 3.4–5.1)
PROT SERPL-MCNC: 7.4 GM/DL (ref 6–8)
RBC # BLD AUTO: 4.77 10*6/UL (ref 4.1–5.1)
T4 FREE SERPL-MCNC: 1.98 NG/DL (ref 0.89–1.76)
TRIGL SERPL-MCNC: 479 MG/DL (ref ?–150)
WBC NRBC COR # BLD AUTO: 11.3 10*3/UL (ref 4.8–10.8)

## 2024-06-07 ENCOUNTER — HOSPITAL ENCOUNTER (OUTPATIENT)
Dept: HOSPITAL 83 - RAD | Age: 74
Discharge: HOME | End: 2024-06-07
Attending: ORTHOPAEDIC SURGERY
Payer: MEDICARE

## 2024-06-07 DIAGNOSIS — M81.0: Primary | ICD-10-CM

## 2024-08-06 ENCOUNTER — HOSPITAL ENCOUNTER (OUTPATIENT)
Dept: HOSPITAL 83 - INJECTION | Age: 74
Discharge: HOME | End: 2024-08-06
Attending: INTERNAL MEDICINE
Payer: MEDICARE

## 2024-08-06 VITALS — DIASTOLIC BLOOD PRESSURE: 49 MMHG

## 2024-08-06 DIAGNOSIS — M81.0: Primary | ICD-10-CM

## 2024-08-06 DIAGNOSIS — E78.00: ICD-10-CM

## 2024-08-06 DIAGNOSIS — E89.0: ICD-10-CM

## 2024-08-06 DIAGNOSIS — J43.9: ICD-10-CM

## 2024-08-06 DIAGNOSIS — Z86.73: ICD-10-CM

## 2024-09-23 ENCOUNTER — HOSPITAL ENCOUNTER (OUTPATIENT)
Dept: HOSPITAL 83 - RAD | Age: 74
Discharge: HOME | End: 2024-09-23
Attending: INTERNAL MEDICINE
Payer: MEDICARE

## 2024-09-23 DIAGNOSIS — M47.816: Primary | ICD-10-CM

## 2024-09-23 DIAGNOSIS — M43.26: ICD-10-CM

## 2025-07-21 ENCOUNTER — HOSPITAL ENCOUNTER (OUTPATIENT)
Dept: HOSPITAL 83 - LAB | Age: 75
Discharge: HOME | End: 2025-07-21
Attending: INTERNAL MEDICINE
Payer: MEDICARE

## 2025-07-21 DIAGNOSIS — F17.210: ICD-10-CM

## 2025-07-21 DIAGNOSIS — R53.83: ICD-10-CM

## 2025-07-21 DIAGNOSIS — I10: Primary | ICD-10-CM

## 2025-07-21 DIAGNOSIS — E78.1: ICD-10-CM

## 2025-07-21 DIAGNOSIS — E53.9: ICD-10-CM

## 2025-07-21 DIAGNOSIS — I95.89: ICD-10-CM

## 2025-07-21 DIAGNOSIS — E78.2: ICD-10-CM

## 2025-07-21 DIAGNOSIS — D50.9: ICD-10-CM

## 2025-07-21 DIAGNOSIS — E55.9: ICD-10-CM

## 2025-07-21 DIAGNOSIS — E03.9: ICD-10-CM

## 2025-07-21 LAB
25(OH)D3 SERPL-MCNC: 33.3 NG/ML (ref 30–100)
ALP SERPL-CCNC: 74 U/L (ref 46–116)
ALT SERPL W P-5'-P-CCNC: 8 U/L (ref 5–49)
BASOPHILS # BLD AUTO: 0.1 10*3/UL (ref 0–0.1)
BASOPHILS NFR BLD AUTO: 0.9 % (ref 0–1)
BUN SERPL-MCNC: 21 MG/DL (ref 9–23)
CHLORIDE SERPL-SCNC: 107 MMOL/L (ref 98–107)
CHOLEST SERPL-MCNC: 211 MG/DL (ref ?–200)
EOSINOPHIL # BLD AUTO: 0.2 10*3/UL (ref 0–0.4)
EOSINOPHIL # BLD AUTO: 1.4 % (ref 1–4)
ERYTHROCYTE [DISTWIDTH] IN BLOOD BY AUTOMATED COUNT: 17.1 % (ref 0–14.5)
HCT VFR BLD AUTO: 38.5 % (ref 37–47)
LDLC SERPL DIRECT ASSAY-MCNC: 94 MG/DL (ref 9–159)
MANUAL DIFFERENTIAL PERFORMED BLD QL: (no result)
MCH RBC QN AUTO: 26 PG (ref 27–31)
MCHC RBC AUTO-ENTMCNC: 30.1 G/DL (ref 33–37)
MCV RBC AUTO: 86.1 FL (ref 81–99)
MONOCYTES # BLD AUTO: 0.6 10*3/UL (ref 0.1–1)
MONOCYTES NFR BLD MANUAL: 4.9 % (ref 3–9)
NEUT #: 8.4 10*3/UL (ref 2.3–7.9)
NEUTROPHILS NFR BLD AUTO: 73.4 % (ref 47–73)
NRBC BLD QL AUTO: 0 % (ref 0–0)
PLATELET # BLD AUTO: 459 10*3/UL (ref 130–400)
PMV BLD AUTO: 10.1 FL (ref 9.6–12.3)
POTASSIUM SERPL-SCNC: 4.5 MMOL/L (ref 3.4–5.1)
PROT SERPL-MCNC: 7 GM/DL (ref 6–8)
RBC # BLD AUTO: 4.47 10*6/UL (ref 4.1–5.1)
T4 FREE SERPL-MCNC: 2.1 NG/DL (ref 0.89–1.76)
TRIGL SERPL-MCNC: 399 MG/DL (ref ?–150)
WBC NRBC COR # BLD AUTO: 11.5 10*3/UL (ref 4.8–10.8)